# Patient Record
Sex: MALE | Race: WHITE | NOT HISPANIC OR LATINO | Employment: FULL TIME | URBAN - METROPOLITAN AREA
[De-identification: names, ages, dates, MRNs, and addresses within clinical notes are randomized per-mention and may not be internally consistent; named-entity substitution may affect disease eponyms.]

---

## 2017-03-13 ENCOUNTER — OFFICE VISIT (OUTPATIENT)
Dept: FAMILY MEDICINE | Facility: CLINIC | Age: 32
End: 2017-03-13
Payer: MEDICAID

## 2017-03-13 ENCOUNTER — TELEPHONE (OUTPATIENT)
Dept: FAMILY MEDICINE | Facility: CLINIC | Age: 32
End: 2017-03-13

## 2017-03-13 VITALS
HEART RATE: 88 BPM | OXYGEN SATURATION: 98 % | HEIGHT: 69 IN | TEMPERATURE: 98.3 F | SYSTOLIC BLOOD PRESSURE: 126 MMHG | WEIGHT: 315 LBS | DIASTOLIC BLOOD PRESSURE: 86 MMHG | BODY MASS INDEX: 46.65 KG/M2

## 2017-03-13 DIAGNOSIS — I80.01 SUPERFICIAL THROMBOPHLEBITIS OF RIGHT LEG: Primary | ICD-10-CM

## 2017-03-13 DIAGNOSIS — I83.891 VARICOSE VEINS OF RIGHT LEG WITH EDEMA: ICD-10-CM

## 2017-03-13 DIAGNOSIS — M79.661 RIGHT CALF PAIN: ICD-10-CM

## 2017-03-13 PROCEDURE — 99213 OFFICE O/P EST LOW 20 MIN: CPT | Performed by: FAMILY MEDICINE

## 2017-03-13 NOTE — MR AVS SNAPSHOT
After Visit Summary   3/13/2017    Se Robins    MRN: [id number]           Patient Information     Date Of Birth          1985        Visit Information        Provider Department      3/13/2017 10:20 AM Nathan Rhodes MD [facility]        Today's Diagnoses     Superficial thrombophlebitis of right leg    -  1    Right calf pain          Care Instructions    Please apply heat to affect area multiple times per day.  Ibuprofen 400-600mg 3 times per day with food.     If not improving or getting worse. Please call [phone] to schedule an appointment for ultrasound to rule out deep blood clot.           Follow-ups after your visit        Future tests that were ordered for you today     Open Future Orders        Priority Expected Expires Ordered    US Lower Extremity Venous Duplex Right Routine  3/13/2018 3/13/2017            Who to contact     If you have questions or need follow up information about today's clinic visit or your schedule please contact [facility] directly at [phone].  Normal or non-critical lab and imaging results will be communicated to you by [employer]Day[employer]over[employer], letter or phone within 4 business days after the clinic has received the results. If you do not hear from us within 7 days, please contact the clinic through [employer] or phone. If you have a critical or abnormal lab result, we will notify you by phone as soon as possible.  Submit refill requests through [employer] or call your pharmacy and they will forward the refill request to us. Please allow 3 business days for your refill to be completed.          Additional Information About Your Visit        1DayMakeoverhart Information     [employer] gives you secure access to your electronic health record. If you see a primary care provider, you can also send messages to your care team and make appointments. If you have questions, please call your primary care clinic.  If you do not have a primary care  "provider, please call [phone] and they will assist you.        Care EveryWhere ID     This is your Care EveryWhere ID. This could be used by other organizations to access your [facility] medical records  [id number]        Your Vitals Were     Pulse Temperature Height Pulse Oximetry BMI (Body Mass Index)       88 98.3  F (36.8  C) (Oral) 5' 9\" (1.753 m) 98% 49.32 kg/m2        Blood Pressure from Last 3 Encounters:   03/13/17 126/86   07/17/15 130/82   03/25/15 130/84    Weight from Last 3 Encounters:   03/13/17 (!) 334 lb (151.5 kg)   07/17/15 (!) 319 lb (144.7 kg)   03/25/15 (!) 315 lb 2 oz (142.9 kg)               Primary Care Provider Office Phone # Fax #    Denisse Alonso [phone]       [address] [address]        Thank you!     Thank you for choosing [facility]  for your care. Our goal is always to provide you with excellent care. Hearing back from our patients is one way we can continue to improve our services. Please take a few minutes to complete the written survey that you may receive in the mail after your visit with us. Thank you!             Your Updated Medication List - Protect others around you: Learn how to safely use, store and throw away your medicines at www.disposemymeds.org.      Notice  As of 3/13/2017 10:59 AM    You have not been prescribed any medications.      "

## 2017-03-13 NOTE — TELEPHONE ENCOUNTER
Pt called in c/o rt leg pain x 2 days. Not increasing.  Slightly swollen at inner thigh and at calf to ankle  Said there is a varicose vein that looks swollen in the rt inner thigh as well as another vein in the rt lower leg.  Appointment made for pt to be evaluated today at  with Dr Rhodes.  Pt in agreement with plan and verbalized understanding.  Thanks!  MARIO Guillen  Triage Nurse

## 2017-03-13 NOTE — NURSING NOTE
"Chief Complaint   Patient presents with     Musculoskeletal Problem       Initial /86 (Cuff Size: Adult Large)  Pulse 88  Temp 98.3  F (36.8  C) (Oral)  Ht 5' 9\" (1.753 m)  Wt (!) 334 lb (151.5 kg)  SpO2 98%  BMI 49.32 kg/m2 Estimated body mass index is 49.32 kg/(m^2) as calculated from the following:    Height as of this encounter: 5' 9\" (1.753 m).    Weight as of this encounter: 334 lb (151.5 kg).  Medication Reconciliation: complete     Jennifer Degroot, DRU      "

## 2017-03-13 NOTE — PROGRESS NOTES
SUBJECTIVE:                                                    Se Robins is a 32 year old male who presents to clinic today for the following health issues:      Musculoskeletal problem/pain      Duration: few days    Description  Location: right leg above knee and inside of calf    Intensity:  mild    Accompanying signs and symptoms: swelling and tightness around calf and nervous because vein is big     History  Previous similar problem: no   Previous evaluation:  none    Precipitating or alleviating factors:  Trauma or overuse: no   Aggravating factors include: none    Therapies tried and outcome: nothing       Medial right knee, right medial calf area - bruise and some pain. Minimal swelling.     States he is smoking and he is overweight.     Few weeks ago - 4 hrs of flight.     No FH of blood clot.     Problem list and histories reviewed & adjusted, as indicated.  Additional history: as documented    Labs reviewed in EPIC    Reviewed and updated as needed this visit by clinical staff  Tobacco  Allergies  Meds  Med Hx  Surg Hx  Fam Hx  Soc Hx      Reviewed and updated as needed this visit by Provider           Social History     Social History     Marital status:      Spouse name: N/A     Number of children: N/A     Years of education: N/A     Social History Main Topics     Smoking status: Current Every Day Smoker     Packs/day: 1.00     Types: Cigarettes     Smokeless tobacco: Never Used     Alcohol use Yes      Comment: occasionally     Drug use: Yes     Special: Marijuana      Comment: cannibis     Sexual activity: Yes     Partners: Female     Other Topics Concern     Parent/Sibling W/ Cabg, Mi Or Angioplasty Before 65f 55m? No     Social History Narrative     No Known Allergies  Patient Active Problem List   Diagnosis     Major depression     Anxiety     CARDIOVASCULAR SCREENING; LDL GOAL LESS THAN 160     Varicose veins of right leg with edema     Reviewed medications, social history and   "past medical and surgical history.    Review of system: for general, respiratory, CVS, GI and psychiatry negative except for noted above.     EXAM:  /86 (Cuff Size: Adult Large)  Pulse 88  Temp 98.3  F (36.8  C) (Oral)  Ht 5' 9\" (1.753 m)  Wt (!) 334 lb (151.5 kg)  SpO2 98%  BMI 49.32 kg/m2  Constitutional: healthy, alert and no distress   Psychiatric: mentation appears normal and affect normal/bright  Abdomen  Morbidly obese.  Right leg - on medial side varicose vein present. On medial knee and just above ankle small rubbery nodular lesion with erythema and mild tendenress    ASSESSMENT / PLAN:  (I80.01) Superficial thrombophlebitis of right leg  (primary encounter diagnosis)  Comment: most likely from presentation.  Plan: dvt very less likely - see below for discussion.     (M79.661) Right calf pain  Comment:  Due to his travel, weight and smoking reasonable to rule out DVT if superficial thrombophlebitis symptoms are not improving with home care but my suspicion is low at this point.   Plan: US Lower Extremity Venous Duplex Right              (I83.891) Varicose veins of right leg with edema  Comment:    Plan: see above.       Patient Instructions   Please apply heat to affect area multiple times per day.  Ibuprofen 400-600mg 3 times per day with food.     If not improving or getting worse. Please call [phone] to schedule an appointment for ultrasound to rule out deep blood clot.             "

## 2017-03-13 NOTE — PATIENT INSTRUCTIONS
Please apply heat to affect area multiple times per day.  Ibuprofen 400-600mg 3 times per day with food.     If not improving or getting worse. Please call [phone] to schedule an appointment for ultrasound to rule out deep blood clot.

## 2017-08-14 ENCOUNTER — APPOINTMENT (OUTPATIENT)
Dept: ULTRASOUND IMAGING | Facility: CLINIC | Age: 32
End: 2017-08-14
Attending: EMERGENCY MEDICINE
Payer: COMMERCIAL

## 2017-08-14 ENCOUNTER — HOSPITAL ENCOUNTER (EMERGENCY)
Facility: CLINIC | Age: 32
Discharge: HOME | End: 2017-08-14
Attending: EMERGENCY MEDICINE | Admitting: EMERGENCY MEDICINE
Payer: COMMERCIAL

## 2017-08-14 ENCOUNTER — TELEPHONE (OUTPATIENT)
Dept: FAMILY MEDICINE | Facility: CLINIC | Age: 32
End: 2017-08-14

## 2017-08-14 VITALS
WEIGHT: 315 LBS | DIASTOLIC BLOOD PRESSURE: 88 MMHG | TEMPERATURE: 98.5 F | SYSTOLIC BLOOD PRESSURE: 120 MMHG | BODY MASS INDEX: 46.96 KG/M2 | HEART RATE: 94 BPM | OXYGEN SATURATION: 95 % | RESPIRATION RATE: 18 BRPM

## 2017-08-14 DIAGNOSIS — I80.01 THROMBOPHLEBITIS OF SUPERFICIAL VEINS OF RIGHT LOWER EXTREMITY: ICD-10-CM

## 2017-08-14 PROCEDURE — 25000128 H RX IP 250 OP 636: Performed by: EMERGENCY MEDICINE

## 2017-08-14 PROCEDURE — 99284 EMERGENCY DEPT VISIT MOD MDM: CPT | Mod: 25 | Performed by: EMERGENCY MEDICINE

## 2017-08-14 PROCEDURE — 99284 EMERGENCY DEPT VISIT MOD MDM: CPT | Mod: Z6 | Performed by: EMERGENCY MEDICINE

## 2017-08-14 PROCEDURE — 96372 THER/PROPH/DIAG INJ SC/IM: CPT | Performed by: EMERGENCY MEDICINE

## 2017-08-14 PROCEDURE — 93971 EXTREMITY STUDY: CPT | Mod: RT

## 2017-08-14 RX ADMIN — ENOXAPARIN SODIUM 150 MG: 150 INJECTION SUBCUTANEOUS at 14:14

## 2017-08-14 ASSESSMENT — ENCOUNTER SYMPTOMS
DIFFICULTY URINATING: 0
FEVER: 0
AGITATION: 0
ABDOMINAL PAIN: 0
NUMBNESS: 0
SHORTNESS OF BREATH: 0
WEAKNESS: 0
COLOR CHANGE: 1
ARTHRALGIAS: 0

## 2017-08-14 NOTE — ED PROVIDER NOTES
[facility] EMERGENCY DEPARTMENT ([facility])    8/14/17       History     Chief Complaint   Patient presents with     Leg Pain     Pt c/o R inner thigh pain and swelling. Pt notes prev. blood clot in March.     HPI  Se Robins is a 32 year old male who was recently treated for superficial thrombophlebitis in March of this year with heat packs and ibuprofen. Patient presents to the Emergency Department today for evaluation of pain, swelling and redness of his medial right thigh. Patient reports the symptoms began 4-5 days ago and he notes difficulty walking and difficulty getting in and out of his truck. Pain is constant, throbbing, non-radiating, mild to moderate, relieved with laying down on his right side and uses a heat pad on his leg. Movement makes the pain worse. Patient notes travelling in his truck weekly to his cabin which is 3 hours each way. He also reports being on a plane a few weeks ago. He denies any groin pain, increased shortness of breath, chest pain or abdominal pain.    I have reviewed the Medications, Allergies, Past Medical and Surgical History, and Social History in the [employer] system.    Past Medical History:   Diagnosis Date     Anxiety      Lymphangioma     per pt this was the diagnosis of his skin below the axilla on the right       History reviewed. No pertinent surgical history.    Family History   Problem Relation Age of Onset     Arthritis Mother      Depression Mother      CEREBROVASCULAR DISEASE Mother      Alcohol/Drug Father      Depression Father      Substance Abuse Father      Alcohol/Drug Brother      Depression Brother      Schizophrenia Brother      Bipolar Disorder Brother      Substance Abuse Brother      Schizophrenia Brother      Bipolar Disorder Brother        Social History   Substance Use Topics     Smoking status: Current Every Day Smoker     Packs/day: 1.00     Types: Cigarettes     Smokeless tobacco: Never Used     Alcohol use Yes      Comment:  occasionally     No current facility-administered medications for this encounter.      Current Outpatient Prescriptions   Medication     [START ON 8/15/2017] enoxaparin (LOVENOX) 150 MG/ML injection         Review of Systems   Constitutional: Negative for fever.   Respiratory: Negative for shortness of breath.    Cardiovascular: Negative for chest pain.   Gastrointestinal: Negative for abdominal pain.   Genitourinary: Negative for difficulty urinating.   Musculoskeletal: Negative for arthralgias.   Skin: Positive for color change.   Allergic/Immunologic: Negative for immunocompromised state.   Neurological: Negative for weakness and numbness.   Psychiatric/Behavioral: Negative for agitation and behavioral problems.   All other systems reviewed and are negative.      Physical Exam   BP: (!) 128/92  Pulse: 94  Temp: 98.5  F (36.9  C)  Resp: 16  Weight: (!) 144.2 kg (318 lb)  SpO2: 94 %  Physical Exam   Constitutional: He is oriented to person, place, and time. He appears well-developed and well-nourished. No distress.   HENT:   Head: Normocephalic and atraumatic.   Eyes: Conjunctivae and EOM are normal. No scleral icterus.   Neck: Normal range of motion.   Cardiovascular: Normal rate and intact distal pulses.    Pulmonary/Chest: Effort normal. No respiratory distress.   Musculoskeletal: Normal range of motion. He exhibits tenderness. He exhibits no edema.   20 cm x 10 cm erythema without induration to right medial thigh. Significant amount of varicose veins in right lower extremity with several areas of hardening over the venous pathway.   Neurological: He is alert and oriented to person, place, and time. No cranial nerve deficit. He exhibits normal muscle tone. Coordination normal.   Skin: Skin is warm. No rash noted. He is not diaphoretic. There is erythema.   20 cm x 10 cm erythema without induration or fluctuance to right medial thigh. Significant amount of varicose veins in right lower extremity with several  areas of hardening over the venous pathway.   Psychiatric: He has a normal mood and affect. His behavior is normal. Judgment and thought content normal.   Nursing note and vitals reviewed.      ED Course     ED Course     Procedures             Critical Care time:  none               Labs Ordered and Resulted from Time of ED Arrival Up to the Time of Departure from the ED - No data to display         Assessments & Plan (with Medical Decision Making)   32-year-old man presenting with right leg swelling and redness for 3-4 days after a prolonged drive and a recent flight to [place], [place]. Differential diagnosis: DVT, superficial thrombophlebitis, unlikely cellulitis.    After thorough history and physical exam, patient appears to be in no acute distress. I obtained right lower extremity ultrasound and I reviewed the study and read the radiology report; there is significant clot present in greater saphenous vein involving almost entire right thigh.    Given patient s symptoms, the extent of the clot burden, and recurrence of his symptoms I believe that at this point it is prudent to initiate anticoagulation since his clot is very close to the deep venous system. I discussed this case with the hematologist on-call, , who agreed and recommended to place him on subcutaneous Lovenox injections with outpatient follow-up. I will treat the patient with subcutaneous Lovenox in the Emergency Department and refer him to hematology and his primary care clinics. He agrees with the plan. He was taught and instructed how to use Lovenox injections at home. He is comfortable with this plan. At this point he will be discharged.     This part of the medical record was transcribed by Alexy Posadas Scribe, from a dictation done by Thomas Cleaning MD.       I have reviewed the nursing notes.    I have reviewed the findings, diagnosis, plan and need for follow up with the patient.    Discharge Medication List as of  8/14/2017  2:24 PM      START taking these medications    Details   enoxaparin (LOVENOX) 150 MG/ML injection Inject 1 mL (150 mg) Subcutaneous every 12 hours for 14 days, Disp-28 mL, R-0, Local PrintFirst dose given in the emergency department at 2:30 PM on August 14, 2017.             Final diagnoses:   Thrombophlebitis of superficial veins of right lower extremity - greater saphenous vein     IVenkat, am serving as a trained medical scribe to document services personally performed by Thomas Cleaning MD, based on the provider's statements to me.   Thomas RIVERA MD, was physically present and have reviewed and verified the accuracy of this note documented by Venkat Ibarra.    8/14/2017   [facility], [place], EMERGENCY DEPARTMENT     Cindy Cleaning MD  08/14/17 [id number]

## 2017-08-14 NOTE — ED AVS SNAPSHOT
[facility], [place], Emergency Department    [address]    [place] [address]    Phone:  [phone]    Fax:  [phone]                                       Se Robins   MRN: [id number]    Department:  [facility], Emergency Department   Date of Visit:  8/14/2017           After Visit Summary Signature Page     I have received my discharge instructions, and my questions have been answered. I have discussed any challenges I see with this plan with the nurse or doctor.    ..........................................................................................................................................  Patient/Patient Representative Signature      ..........................................................................................................................................  Patient Representative Print Name and Relationship to Patient    ..................................................               ................................................  Date                                            Time    ..........................................................................................................................................  Reviewed by Signature/Title    ...................................................              ..............................................  Date                                                            Time

## 2017-08-14 NOTE — DISCHARGE INSTRUCTIONS
Please make an appointment to follow up with Your Primary Care Provider and [facility] Clinic (phone: [phone]) in 2-3 days for further evaluation, recommendations, and transition to oral anticoagulant medications.

## 2017-08-14 NOTE — ED AVS SNAPSHOT
[facility], Emergency Department    [address]    [place] [address]    Phone:  [phone]    Fax:  [phone]                                       Se Robins   MRN: [id number]    Department:  [facility], Emergency Department   Date of Visit:  8/14/2017           Patient Information     Date Of Birth          1985        Your diagnoses for this visit were:     Thrombophlebitis of superficial veins of right lower extremity greater saphenous vein       You were seen by Cindy Cleaning MD.        Discharge Instructions       Please make an appointment to follow up with Your Primary Care Provider and [facility] Clinic (phone: [phone]) in 2-3 days for further evaluation, recommendations, and transition to oral anticoagulant medications.      Discharge References/Attachments     THROMBOPHLEBITIS, SUPERFICIAL (ENGLISH)    ENOXAPARIN INJECTION (ENGLISH)      24 Hour Appointment Hotline       To make an appointment at any [facility], call [phone] ([phone]). If you don't have a family doctor or clinic, we will help you find one. [facility] clinics are conveniently located to serve the needs of you and your family.             Review of your medicines      START taking        Dose / Directions Last dose taken    enoxaparin 150 MG/ML injection   Commonly known as:  LOVENOX   Dose:  1 mg/kg   Quantity:  28 mL   Start taking on:  8/15/2017        Inject 1 mL (150 mg) Subcutaneous every 12 hours for 14 days   Refills:  0                Prescriptions were sent or printed at these locations (1 Prescription)                   Other Prescriptions                Printed at Department/Unit printer (1 of 1)         enoxaparin (LOVENOX) 150 MG/ML injection                Procedures and tests performed during your visit     US Lower Extremity Venous Duplex Right      Orders Needing Specimen Collection     None      Pending Results     No orders found from 8/12/2017 to  8/15/2017.            Pending Culture Results     No orders found from 8/12/2017 to 8/15/2017.            Pending Results Instructions     If you had any lab results that were not finalized at the time of your Discharge, you can call the ED Lab Result RN at [phone]. You will be contacted by this team for any positive Lab results or changes in treatment. The nurses are available 7 days a week from 10A to 6:30P.  You can leave a message 24 hours per day and they will return your call.        Thank you for choosing [facility]       Thank you for choosing [facility] for your care. Our goal is always to provide you with excellent care. Hearing back from our patients is one way we can continue to improve our services. Please take a few minutes to complete the written survey that you may receive in the mail after you visit with us. Thank you!        Mind[employer] Information     [employer] gives you secure access to your electronic health record. If you see a primary care provider, you can also send messages to your care team and make appointments. If you have questions, please call your primary care clinic.  If you do not have a primary care provider, please call [phone] and they will assist you.        Care EveryWhere ID     This is your Care EveryWhere ID. This could be used by other organizations to access your [facility] medical records  [id number]        Equal Access to Services     WARD WALDRON : Blu alonsoo Francois, waaxda luqadaha, qaybta kaalmada adeegyada, larisa garcia. So [facility] [phone].    ATENCIÓN: Si habla español, tiene a bynum disposición servicios gratuitos de asistencia lingüística. Llame al [phone].    We comply with applicable federal civil rights laws and [place] laws. We do not discriminate on the basis of race, color, national origin, age, disability sex, sexual orientation or gender identity.            After Visit Summary       This is your record. Keep  this with you and show to your community pharmacist(s) and doctor(s) at your next visit.

## 2017-08-14 NOTE — TELEPHONE ENCOUNTER
PT's wife called for appt.  Pt has blood clot in thigh.  Hx of varicose veins.  Increased pain, redness, and warm.    No cp or shortness of breath. No trouble breathing .  Pt was seen 3/13/17 for similar issue.    Triage was going to make appt for tomorrow in clinic. When I reviewed sx to go right to ER- increased pain, redness, warmth, swelling. All those sx are current.  Rec pt go to ER today.  Wife in agreement with plan.  MARIO Cunningham

## 2019-10-04 ENCOUNTER — HEALTH MAINTENANCE LETTER (OUTPATIENT)
Age: 34
End: 2019-10-04

## 2020-11-14 ENCOUNTER — HEALTH MAINTENANCE LETTER (OUTPATIENT)
Age: 35
End: 2020-11-14

## 2021-05-24 ENCOUNTER — RECORDS - HEALTHEAST (OUTPATIENT)
Dept: ADMINISTRATIVE | Facility: CLINIC | Age: 36
End: 2021-05-24

## 2021-09-12 ENCOUNTER — HEALTH MAINTENANCE LETTER (OUTPATIENT)
Age: 36
End: 2021-09-12

## 2021-10-22 ENCOUNTER — ANCILLARY PROCEDURE (OUTPATIENT)
Dept: GENERAL RADIOLOGY | Facility: CLINIC | Age: 36
End: 2021-10-22
Attending: NURSE PRACTITIONER
Payer: COMMERCIAL

## 2021-10-22 ENCOUNTER — OFFICE VISIT (OUTPATIENT)
Dept: URGENT CARE | Facility: URGENT CARE | Age: 36
End: 2021-10-22
Payer: COMMERCIAL

## 2021-10-22 VITALS
DIASTOLIC BLOOD PRESSURE: 82 MMHG | SYSTOLIC BLOOD PRESSURE: 128 MMHG | HEART RATE: 90 BPM | RESPIRATION RATE: 16 BRPM | OXYGEN SATURATION: 100 % | TEMPERATURE: 100.4 F

## 2021-10-22 DIAGNOSIS — M79.642 PAIN OF LEFT HAND: ICD-10-CM

## 2021-10-22 DIAGNOSIS — S63.602A SPRAIN OF LEFT THUMB, INITIAL ENCOUNTER: ICD-10-CM

## 2021-10-22 DIAGNOSIS — M79.645 PAIN OF LEFT THUMB: ICD-10-CM

## 2021-10-22 DIAGNOSIS — L03.311 ABDOMINAL WALL CELLULITIS: Primary | ICD-10-CM

## 2021-10-22 PROCEDURE — 73130 X-RAY EXAM OF HAND: CPT | Mod: LT | Performed by: RADIOLOGY

## 2021-10-22 PROCEDURE — 99204 OFFICE O/P NEW MOD 45 MIN: CPT | Mod: 25 | Performed by: NURSE PRACTITIONER

## 2021-10-22 PROCEDURE — 96372 THER/PROPH/DIAG INJ SC/IM: CPT | Performed by: NURSE PRACTITIONER

## 2021-10-22 RX ORDER — CEFTRIAXONE SODIUM 1 G
1 VIAL (EA) INJECTION ONCE
Status: COMPLETED | OUTPATIENT
Start: 2021-10-22 | End: 2021-10-22

## 2021-10-22 RX ORDER — DOXYCYCLINE 100 MG/1
100 TABLET ORAL 2 TIMES DAILY
Qty: 20 TABLET | Refills: 0 | Status: SHIPPED | OUTPATIENT
Start: 2021-10-22 | End: 2021-11-01

## 2021-10-22 RX ADMIN — Medication 1 G: at 16:52

## 2021-10-22 NOTE — PROGRESS NOTES
Chief Complaint   Patient presents with     Urgent Care     Musculoskeletal Problem     possible broken or thumb sprain on Monday night playing softball     Derm Problem     painful lump on stomach since Monday         ICD-10-CM    1. Abdominal wall cellulitis  L03.311 cefTRIAXone (ROCEPHIN) in lidocaine 1% (PF) injection 1 g     doxycycline monohydrate (ADOXA) 100 MG tablet   2. Pain of left hand  M79.642 XR Hand Left G/E 3 Views     THUMB SPICA SPLINT   3. Pain of left thumb  M79.645 XR Hand Left G/E 3 Views     THUMB SPICA SPLINT   4. Sprain of left thumb, initial encounter  S63.602A THUMB SPICA SPLINT     Patient with large abdominal wall cellulitis will be treated with injectable antibiotic and sent home with oral to follow-up.  If the area does not decrease in size over the next 24 hours patient is instructed to go to the emergency room for further treatment.  This is a very large area and there could be an internal abscess with sepsis being a risk.    Wear thumb spica splint until thumb is feeling better.  May use ibuprofen or Tylenol as needed for pain continue icing for 15 to 20 minutes a couple times a day until swelling has resolved.  If he is still having pain in 3 weeks he is instructed to have a repeat x-ray.      Xray - Reviewed and interpreted by me.  Right hand x-ray is negative for fractures or dislocations.    Subjective     Se Robins is an 36 year old male who presents to clinic today for pain in the right thumb and near the first metacarpal since playing softball 6 days ago and catching a ball in the glove where his thumb was bent backwards.    Secondly he has a large area of erythema and pain under his abdominal fold on the right side.  He has been running low-grade fevers and the pain has been worsening.    ROS: 10 point ROS neg other than the symptoms noted above in the HPI.       Objective    /82   Pulse 90   Temp 100.4  F (38  C)   Resp 16   SpO2 100%     Physical Exam        GENERAL APPEARANCE: healthy appearing, alert     RESP: lungs clear to auscultation - no rales, rhonchi or wheezes     CV: regular rates and rhythm, no murmurs, rubs, or gallop     ABDOMEN:  soft, nontender, no HSM or masses and bowel sounds normal     MS: tender to palpation over the base of the thumb and the first metacarpal, swelling and ecchymosis is present, all other extremities appear normal.     SKIN: Large area of erythema measuring 9 inches x 7 inches under the right abdominal fold with central area of induration and pain to palpation     NEURO: Normal strength and tone, mentation intact and speech normal     PSYCH: normal thought process; no significant mood disturbance    Patient Instructions   If the area of redness does not diminish in size in 24 to 36 hours please go to the emergency room for further treatment.    Wear the thumb spica splint until the thumb is feeling better.  Ice for 20 minutes several times a day until the swelling has resolved.    Acetaminophen (Tylenol) may be taken up to 4000mg daily (3000mg if over 65) which would be 2 regular strength tablets (325mg) or two extra strength tablets(500mg) up to 4 times a day (3 times a day if over 65).   Check for acetaminophen in other OTC or prescription medications and be sure you add this in the maximum amount you take every day.    Too much acetaminophen can lead to serious liver damage. DO NOT TAKE if you have a history of liver disease.    Ibuprofen 200mg tablets may be taken up to 4 pills 4 times a day(three times a day if over 65)  to the maximum of 3200mg,  (2400mg if >65)  daily as needed for pain.   Take with food.  Don't take with aspirin, Aleve or other antiinflammatory medication or with warfarin. DO NOT TAKE if you have a history of kidney disease.    Patient Education     Discharge Instructions for Cellulitis   You have been diagnosed with cellulitis. This is an infection in the deepest layer of the skin and tissue beneath the  skin. In some cases, the infection also affects the muscle. Cellulitis is caused by bacteria. The bacteria can enter the body through broken skin. This can happen with a cut, scratch, animal bite, or an insect bite that has been scratched. You may have been treated in the hospital with antibiotics and fluids. You will likely be given a prescription for antibiotics to take at home. This sheet will help you take care of yourself at home.  Home care  When you are home:    Take the prescribed antibiotic medicine you are given as directed until it is gone. Take it even if you feel better. It treats the infection and stops it from returning. Not taking all the medicine can make future infections hard to treat.    Keep the infected area clean.    When possible, raise the infected area above the level of your heart. This helps keep swelling down.    Talk with your healthcare provider if you are in pain. Ask what kind of over-the-counter medicine you can take for pain.    Apply clean bandages as advised.    Take your temperature once a day for a week.    Wash your hands often to prevent spreading the infection.  In the future, wash your hands before and after you touch cuts, scratches, or bandages. This will help prevent infection.   When to call your healthcare provider  Call your healthcare provider right away if you have any of the following:    Trouble or pain when moving the joints above or below the infected area    Discharge or pus draining from the area    Fever of 100.4 F (38 C) or higher, or as directed by your healthcare provider    Pain that gets worse in or around the infected     Redness that gets worse in or around the infected area, particularly if the area of redness expands to a wider area    Shaking chills    Swelling of the infected area    Vomiting  Lizette last reviewed this educational content on 11/1/2019 2000-2021 [employer]. All rights reserved. This information is not intended as  a substitute for professional medical care. Always follow your healthcare professional's instructions.               MUKUND Singer, CNP  [facility] Provider

## 2021-10-22 NOTE — PATIENT INSTRUCTIONS
If the area of redness does not diminish in size in 24 to 36 hours please go to the emergency room for further treatment.    Wear the thumb spica splint until the thumb is feeling better.  Ice for 20 minutes several times a day until the swelling has resolved.    Acetaminophen (Tylenol) may be taken up to 4000mg daily (3000mg if over 65) which would be 2 regular strength tablets (325mg) or two extra strength tablets(500mg) up to 4 times a day (3 times a day if over 65).   Check for acetaminophen in other OTC or prescription medications and be sure you add this in the maximum amount you take every day.    Too much acetaminophen can lead to serious liver damage. DO NOT TAKE if you have a history of liver disease.    Ibuprofen 200mg tablets may be taken up to 4 pills 4 times a day(three times a day if over 65)  to the maximum of 3200mg,  (2400mg if >65)  daily as needed for pain.   Take with food.  Don't take with aspirin, Aleve or other antiinflammatory medication or with warfarin. DO NOT TAKE if you have a history of kidney disease.    Patient Education     Discharge Instructions for Cellulitis   You have been diagnosed with cellulitis. This is an infection in the deepest layer of the skin and tissue beneath the skin. In some cases, the infection also affects the muscle. Cellulitis is caused by bacteria. The bacteria can enter the body through broken skin. This can happen with a cut, scratch, animal bite, or an insect bite that has been scratched. You may have been treated in the hospital with antibiotics and fluids. You will likely be given a prescription for antibiotics to take at home. This sheet will help you take care of yourself at home.  Home care  When you are home:    Take the prescribed antibiotic medicine you are given as directed until it is gone. Take it even if you feel better. It treats the infection and stops it from returning. Not taking all the medicine can make future infections hard to  treat.    Keep the infected area clean.    When possible, raise the infected area above the level of your heart. This helps keep swelling down.    Talk with your healthcare provider if you are in pain. Ask what kind of over-the-counter medicine you can take for pain.    Apply clean bandages as advised.    Take your temperature once a day for a week.    Wash your hands often to prevent spreading the infection.  In the future, wash your hands before and after you touch cuts, scratches, or bandages. This will help prevent infection.   When to call your healthcare provider  Call your healthcare provider right away if you have any of the following:    Trouble or pain when moving the joints above or below the infected area    Discharge or pus draining from the area    Fever of 100.4 F (38 C) or higher, or as directed by your healthcare provider    Pain that gets worse in or around the infected     Redness that gets worse in or around the infected area, particularly if the area of redness expands to a wider area    Shaking chills    Swelling of the infected area    Vomiting  [employer] last reviewed this educational content on 11/1/2019 2000-2021 [employer]. All rights reserved. This information is not intended as a substitute for professional medical care. Always follow your healthcare professional's instructions.

## 2021-10-25 ENCOUNTER — NURSE TRIAGE (OUTPATIENT)
Dept: NURSING | Facility: CLINIC | Age: 36
End: 2021-10-25

## 2021-10-25 NOTE — TELEPHONE ENCOUNTER
Caller states he has been on antibiotic for an abdominal cellulitis for  3 days; Area broke open today and has a steady stream   Of purulent discharge for several hours; no new fever or spreading redness but  abscess remains painful; unsure how big opening is that pus is coming through.   Triage protocol reviewed   Advised that  On call PCP will be consulted for disposition   6:11 PM  Call back from Dr. Saxena; advised patient to be seen  In clinic or UC within 24 hrs  Sooner if new or worsening symptoms   Discussed with caller and he will go to UC tomorrow  Advised in careful discarding of purulent  bandages and  good handwashing and skin care   Advised to call back or proceed to ED for any new or worseninsg symptoms   Caller understands and will comply   Jany Holloway RN  FNA     Jany Holloway RN  FNA        Reason for Disposition    [1] Caller has URGENT question AND [2] triager unable to answer question    Additional Information    Negative:  surgical wound infection suspected (post-op)    Negative: Surgical wound infection suspected (post-op)    Negative: [1] Widespread rash AND [2] drug rash suspected (i.e., allergic reaction to antibiotic)    Negative: Animal bite wound infection suspected    Negative: Shock suspected (e.g., cold/pale/clammy skin, too weak to stand, low BP, rapid pulse)    Negative: Sounds like a life-threatening emergency to the triager    Negative: SEVERE pain    Negative: [1] SEVERE pain with bending of finger (or toe) AND [2] cellulitis on hand (or foot)    Negative: Fever > 104 F (40 C)    Negative: [1] Widespread rash AND [2] bright red, sunburn-like    Negative: Black (necrotic), dark purple, or blisters develop in area of cellulitis    Negative: Patient sounds very sick or weak to the triager    Negative: [1] Taking antibiotic > 24 hours AND [2] fever > 100.4 F (38.0 C)    Negative: [1] Taking antibiotic > 24 hours AND [2] red streak (or line) runs from area of  infection    Protocols used: CELLULITIS ON ANTIBIOTIC FOLLOW-UP CALL-A-AH

## 2021-10-26 ENCOUNTER — OFFICE VISIT (OUTPATIENT)
Dept: URGENT CARE | Facility: URGENT CARE | Age: 36
End: 2021-10-26
Payer: COMMERCIAL

## 2021-10-26 VITALS
HEART RATE: 84 BPM | SYSTOLIC BLOOD PRESSURE: 118 MMHG | OXYGEN SATURATION: 97 % | TEMPERATURE: 98.1 F | DIASTOLIC BLOOD PRESSURE: 68 MMHG | RESPIRATION RATE: 16 BRPM

## 2021-10-26 DIAGNOSIS — L02.219 CELLULITIS AND ABSCESS OF TRUNK: ICD-10-CM

## 2021-10-26 DIAGNOSIS — L03.319 CELLULITIS AND ABSCESS OF TRUNK: ICD-10-CM

## 2021-10-26 DIAGNOSIS — L02.211 ABDOMINAL WALL ABSCESS: ICD-10-CM

## 2021-10-26 DIAGNOSIS — R68.83 CHILLS: Primary | ICD-10-CM

## 2021-10-26 LAB
BASOPHILS # BLD AUTO: 0.1 10E3/UL (ref 0–0.2)
BASOPHILS NFR BLD AUTO: 1 %
EOSINOPHIL # BLD AUTO: 0.3 10E3/UL (ref 0–0.7)
EOSINOPHIL NFR BLD AUTO: 3 %
ERYTHROCYTE [DISTWIDTH] IN BLOOD BY AUTOMATED COUNT: 13.3 % (ref 10–15)
HCT VFR BLD AUTO: 42.1 % (ref 40–53)
HGB BLD-MCNC: 14.3 G/DL (ref 13.3–17.7)
IMM GRANULOCYTES # BLD: 0.1 10E3/UL
IMM GRANULOCYTES NFR BLD: 1 %
LYMPHOCYTES # BLD AUTO: 3 10E3/UL (ref 0.8–5.3)
LYMPHOCYTES NFR BLD AUTO: 33 %
MCH RBC QN AUTO: 29.4 PG (ref 26.5–33)
MCHC RBC AUTO-ENTMCNC: 34 G/DL (ref 31.5–36.5)
MCV RBC AUTO: 87 FL (ref 78–100)
MONOCYTES # BLD AUTO: 1 10E3/UL (ref 0–1.3)
MONOCYTES NFR BLD AUTO: 11 %
NEUTROPHILS # BLD AUTO: 4.6 10E3/UL (ref 1.6–8.3)
NEUTROPHILS NFR BLD AUTO: 51 %
PLATELET # BLD AUTO: 312 10E3/UL (ref 150–450)
RBC # BLD AUTO: 4.86 10E6/UL (ref 4.4–5.9)
WBC # BLD AUTO: 9.1 10E3/UL (ref 4–11)

## 2021-10-26 PROCEDURE — 36415 COLL VENOUS BLD VENIPUNCTURE: CPT | Performed by: FAMILY MEDICINE

## 2021-10-26 PROCEDURE — 85025 COMPLETE CBC W/AUTO DIFF WBC: CPT | Performed by: FAMILY MEDICINE

## 2021-10-26 PROCEDURE — 99214 OFFICE O/P EST MOD 30 MIN: CPT | Performed by: FAMILY MEDICINE

## 2021-10-26 PROCEDURE — 87077 CULTURE AEROBIC IDENTIFY: CPT | Performed by: FAMILY MEDICINE

## 2021-10-26 PROCEDURE — 87070 CULTURE OTHR SPECIMN AEROBIC: CPT | Performed by: FAMILY MEDICINE

## 2021-10-26 RX ORDER — SULFAMETHOXAZOLE/TRIMETHOPRIM 800-160 MG
1 TABLET ORAL 2 TIMES DAILY
Qty: 20 TABLET | Refills: 0 | Status: SHIPPED | OUTPATIENT
Start: 2021-10-26 | End: 2022-02-03

## 2021-10-26 NOTE — PROGRESS NOTES
Chief Complaint   Patient presents with     Urgent Care     Derm Problem     rash rlq on abdomen, was seen 4 days ago.        Differential Diagnosis:  Skin cellulitis, skin abscess, carbuncle   Medical Decision Making:    ASSESMENT AND PLAN   Se was seen today for urgent care and derm problem.    Diagnoses and all orders for this visit:    Chills    Cellulitis and abscess of trunk  -     Abscess Aerobic Bacterial Culture Routine  -     sulfamethoxazole-trimethoprim (BACTRIM DS) 800-160 MG tablet; Take 1 tablet by mouth 2 times daily  -     CBC with platelets and differential; Future  -     CBC with platelets and differential    Abdominal wall abscess    as  worsening chills and worsening redness would consider getting a CBC  Wound culture obtained area was cleaned bacitracin applied and gauze applied and secured with paper tape    Tylenol and Rest  Advised to  Do warm compress  Wound culture obtained   Routine discharge counseling was given to the patient and the patient understands that worsening, changing or persistent symptoms should prompt an immediate call or follow up with their primary physician or the emergency department. The importance of appropriate follow up was also discussed with the patient.     I have reviewed the nursing notes.  Time  spent on the date of the encounter doing chart review, patient visit, documentation and discussion with family     see orders in Epic  Pt verbalized and agreed with the plan and is aware of the worsening symptoms for which would need to follow up .  Pt was stable during time of discharge from the clinic     SUBJECTIVE     Se Robins is a 36 year old male presenting with a chief complaint of    Chief Complaint   Patient presents with     Urgent Care     Derm Problem     rash rlq on abdomen, was seen 4 days ago.            Se Robins is a 36 year old male presenting with a chief complaint of follow-up of right lower abdominal wall skin abscess.  He was started  on doxycycline 4 days back and day back the abscess opened up and pus started draining.  Has been also noticing worsening redness in the area with worsening tenderness.  Denies any acute fever but has been noticing some chills.  Overall he has been feeling fine has some nausea for 5 days back has none now.   He is an established patient of [facility].  Onset of symptoms was 11 day(s) ago.  Course of illness is worsening.    Severity moderate  Current and Associated symptoms: pain , redness , drainage   Treatment measures tried include see above .  Predisposing factors include akin abscess.    Past Medical History:   Diagnosis Date     Anxiety      Lymphangioma     per pt this was the diagnosis of his skin below the axilla on the right     Current Outpatient Medications   Medication Sig Dispense Refill     doxycycline monohydrate (ADOXA) 100 MG tablet Take 1 tablet (100 mg) by mouth 2 times daily for 10 days 20 tablet 0     sulfamethoxazole-trimethoprim (BACTRIM DS) 800-160 MG tablet Take 1 tablet by mouth 2 times daily 20 tablet 0     Social History     Tobacco Use     Smoking status: Former Smoker     Packs/day: 1.00     Types: Cigarettes     Smokeless tobacco: Never Used   Substance Use Topics     Alcohol use: Yes     Comment: occasionally     Family History   Problem Relation Age of Onset     Arthritis Mother      Depression Mother      Cerebrovascular Disease Mother      Alcohol/Drug Father      Depression Father      Substance Abuse Father      Alcohol/Drug Brother      Depression Brother      Schizophrenia Brother      Bipolar Disorder Brother      Substance Abuse Brother      Schizophrenia Brother      Bipolar Disorder Brother          ROS:    10 point ROS of systems including Constitutional, Eyes, Respiratory, Cardiovascular, Gastroenterology, Genitourinary Muscularskeletal, Psychiatric ,neurological were all negative except for pertinent positives noted in my HPI         OBJECTIVE:    /68   Pulse 84    Temp 98.1  F (36.7  C) (Oral)   Resp 16   SpO2 97%   GENERAL APPEARANCE: healthy, alert and no distress  EYES: EOMI,  PERRL, conjunctiva clear  HENT: ear canals and TM's normal.  Nose and mouth without ulcers, erythema or lesions  CV: regular rates and rhythm, normal S1 S2, no murmur noted  ABDOMEN:  soft, tender area around the skin abscess, no HSM or masses and bowel sounds normal  SKIN:  area of induration noted measuring about 38 cm by 10 cm tiny opening noted with no drainage noted currently  PSYCH: mentation appears normal  Physical Exam      (Note was completed, in part, with [employer] voice-recognition software. Documentation reviewed, but some grammatical, spelling, and word errors may remain.)  Joanne King MD on 10/26/2021 at 1:27 PM

## 2021-10-28 LAB — BACTERIA ABSC ANAEROBE+AEROBE CULT: ABNORMAL

## 2022-01-02 ENCOUNTER — HEALTH MAINTENANCE LETTER (OUTPATIENT)
Age: 37
End: 2022-01-02

## 2022-02-03 ENCOUNTER — ANCILLARY PROCEDURE (OUTPATIENT)
Dept: ULTRASOUND IMAGING | Facility: CLINIC | Age: 37
End: 2022-02-03
Attending: FAMILY MEDICINE
Payer: COMMERCIAL

## 2022-02-03 ENCOUNTER — OFFICE VISIT (OUTPATIENT)
Dept: PEDIATRICS | Facility: CLINIC | Age: 37
End: 2022-02-03
Attending: FAMILY MEDICINE
Payer: COMMERCIAL

## 2022-02-03 ENCOUNTER — VIRTUAL VISIT (OUTPATIENT)
Dept: FAMILY MEDICINE | Facility: CLINIC | Age: 37
End: 2022-02-03
Payer: COMMERCIAL

## 2022-02-03 VITALS
HEART RATE: 82 BPM | DIASTOLIC BLOOD PRESSURE: 81 MMHG | OXYGEN SATURATION: 97 % | RESPIRATION RATE: 18 BRPM | TEMPERATURE: 98.4 F | SYSTOLIC BLOOD PRESSURE: 126 MMHG

## 2022-02-03 DIAGNOSIS — I80.01 THROMBOPHLEBITIS OF SUPERFICIAL VEINS OF RIGHT LOWER EXTREMITY: ICD-10-CM

## 2022-02-03 DIAGNOSIS — I80.01 THROMBOPHLEBITIS OF SUPERFICIAL VEINS OF RIGHT LOWER EXTREMITY: Primary | ICD-10-CM

## 2022-02-03 PROCEDURE — 99215 OFFICE O/P EST HI 40 MIN: CPT | Performed by: FAMILY MEDICINE

## 2022-02-03 PROCEDURE — 99207 REFERRAL TO ACUTE AND DIAGNOSTIC SERVICES: CPT | Performed by: FAMILY MEDICINE

## 2022-02-03 PROCEDURE — 93971 EXTREMITY STUDY: CPT | Mod: RT | Performed by: RADIOLOGY

## 2022-02-03 RX ORDER — APIXABAN 5 MG (74)
KIT ORAL
Qty: 74 EACH | Refills: 0 | Status: SHIPPED | OUTPATIENT
Start: 2022-02-03 | End: 2022-03-05

## 2022-02-03 NOTE — PROGRESS NOTES
Assessment & Plan     Thrombophlebitis of superficial veins of right lower extremity  - Referral to Acute and Diagnostic Services (Day of diagnostic / First order acute)  - US Lower Extremity Venous Duplex Right  - Apixaban Starter Pack (ELIQUIS DVT/PE STARTER PACK) 5 MG TBPK  Dispense: 74 each; Refill: 0     Discussed with referring primary care physician, we will plan to have patient schedule an appointment in 1 week with his PCP. Given his previous history will elect to place him on blood thinners with the assumption of risk as  this could progress/worsen - Eliquis prescribed.     He is to do warm /cold compresses (Whichever feels better) to relieve discomfort in his leg    See AVS summary for additional recommendations reviewed with patient during this visit.       Follow-up with PCP next week    Phani De Leon MD   [facility]    Enedelia Saez is a 36 year old male who presents to clinic today for the following health issues:  Chief Complaint   Patient presents with     Musculoskeletal Problem     right leg     HPI    Patient is referred to us today from his primary care physician's office    2017 Hx of Greater saphenous thrombus/superficial thrombophlebitis had discussion with hematologist specialist for this in the past and was on lovenox-> warfarin for approximately 1 month    Patient presents today with redness and pain of thigh similar to his previous presentation - present for just under a week. No open wounds/injuries to this area.     REmedies attempted: none    Denies chest pain/shortness of breath. No lower leg swelling.       Patient Active Problem List    Diagnosis Date Noted     Thrombophlebitis of superficial veins of right lower extremity 08/14/2017     Priority: Medium     Greater saphenous vein.       Varicose veins of right leg with edema 03/13/2017     Priority: Medium     Anxiety 01/11/2014     Priority: Medium     CARDIOVASCULAR SCREENING; LDL GOAL LESS THAN 160  01/11/2014     Priority: Medium     Major depression 01/06/2014     Priority: Medium       Current Outpatient Medications   Medication     Apixaban Starter Pack (ELIQUIS DVT/PE STARTER PACK) 5 MG TBPK     No current facility-administered medications for this visit.           Objective    /81 (BP Location: Right arm, Patient Position: Sitting, Cuff Size: Adult Large)   Pulse 82   Temp 98.4  F (36.9  C) (Oral)   Resp 18   SpO2 97%   Physical Exam     R leg: medial thigh area a large area > 10x 15 cm of diffuse redness/firmness along venous system , visible varicose veins  Gait: normal  Pulm: non-labored  GEN: pleasant, no distress    Results for orders placed or performed in visit on 02/03/22   US Lower Extremity Venous Duplex Right     Status: None    Narrative    RIGHT LOWER EXTREMITY DUPLEX VENOUS ULTRASOUND 2/3/2022 3:50 PM    CLINICAL HISTORY: hx of great saphenous thrombosis/superficial  thrombophlebitis; Thrombophlebitis of superficial veins of right lower  extremity.     COMPARISONS: 8/14/2017    REFERRING PROVIDER: SHAMEKA EDWARDS    TECHNIQUE: Grayscale, color Doppler, Doppler waveform ultrasound  evaluation was performed through the right common femoral, femoral,  and popliteal veins. Right posterior tibial and peroneal veins were  evaluated with grayscale imaging and compression.    Left common femoral vein was evaluated for symmetry.    FINDINGS: Left common femoral vein is patent, fully compressible, and  demonstrates normal phasic Doppler waveform.    Right common femoral, femoral, and popliteal veins are fully  compressible, patent, and demonstrate normal phasic Doppler waveforms.    Right posterior tibial veins are fully compressible to the ankle.    Right peroneal veins are fully compressible to the distal calf.    The proximal right great saphenous vein is partially compressible near  the saphenofemoral junction in the upper thigh and noncompressible in  the mid/distal thigh through  proximal calf. No extension of thrombus  into the right common femoral vein.      Impression    IMPRESSION:   1. No deep venous thrombosis demonstrated in the RIGHT leg.  2. Superficial venous thrombophlebitis of the proximal and mid right  greater saphenous vein, similar to 2017.    I have personally reviewed the examination and initial interpretation  and I agree with the findings.    AURA MARTELL MD         SYSTEM ID:  [id number]         The use of Dragon/Sajanation services may have been used to construct the content in this note; any grammatical or spelling errors are non-intentional. Please contact the author of this note directly if you are in need of any clarification.

## 2022-02-03 NOTE — PROGRESS NOTES
HPI     Pain History:  When did you first notice your pain? - Less than 1 week   Have you seen anyone else for your pain? Yes - Primary Care  Where in your body do you have pain? Musculoskeletal problem/pain  Onset/Duration: Tuesday 2/1/22  Description  Location: leg - right  Joint Swelling: YES  Redness: YES  Pain: YES  Warmth: YES  Intensity:  moderate  Progression of Symptoms:  same  Accompanying signs and symptoms:   Fevers: no  Numbness/tingling/weakness: no  History  Trauma to the area: no  Recent illness:  no  Previous similar problem: YES, same leg, same spot on leg  Previous evaluation:  YES  Precipitating or alleviating factors:  Aggravating factors include: standing  Therapies tried and outcome: rest/inactivity and heat          Review of Systems     Objective    /81 (BP Location: Right arm, Patient Position: Sitting, Cuff Size: Adult Large)   Pulse 82   Temp 98.4  F (36.9  C) (Oral)   Resp 18   SpO2 97%   There is no height or weight on file to calculate BMI.  Physical Exam

## 2022-02-03 NOTE — PROGRESS NOTES
Se is a 36 year old who is being evaluated via a billable video visit.      How would you like to obtain your AVS? MyChart  If the video visit is dropped, the invitation should be resent by: Text to cell phone: [phone]  Will anyone else be joining your video visit? No    Video Start Time: 1:33 pm    Assessment & Plan   1. Thrombophlebitis of superficial veins of right lower extremity: Patient with similar symptoms as when he had thrombus of the great saphenous vein on the right in 2017.  Given patient's history he should have an ultrasound today to look at clot burden and need for/duration of anticoagulation.  He is willing to go to the [facility] today in [place].  I did call there in advance and they are willing to accept the patient as long as he arise prior to 3 PM.  He will leave now and is in agreement with plan.  - Referral to Acute and Diagnostic Services (Day of diagnostic / First order acute); Future    Jareth Aguilar MD  [facility]    This chart is completed utilizing dictation software; typos and/or incorrect word substitutions may unintentionally occur.       Subjective   Se is a 36 year old who presents for the following health issues    HPI     Patient reports history of prior thrombosis in right lower extremity. Chart review showed this occurred in March of 2017 and again in August 2017. He was placed on Lovenox per the ER note in August 2017. He states he was on warfarin after that, but I do not have records.    States he typically has varicose veins in this area. No trauma. No chest pain or shortess of breath.    He developed pain, redness, and firm feeling of the vein on upper right thigh.    Lives in [place] and would be willing to drive to [place] [facility] today if they can get him in.    Review of Systems   Constitutional, HEENT, cardiovascular, pulmonary, gi and gu systems are negative, except as otherwise noted.      Objective       Vitals:  No  vitals were obtained today due to virtual visit.    Physical Exam   GENERAL: Healthy, alert and no distress  EYES: Eyes grossly normal to inspection.  No discharge or erythema, or obvious scleral/conjunctival abnormalities.  RESP: No audible wheeze, cough, or visible cyanosis.  No visible retractions or increased work of breathing.    SKIN: Redness on right inner thigh. Visible skin clear. No significant rash, abnormal pigmentation or lesions.  NEURO: Cranial nerves grossly intact.  Mentation and speech appropriate for age.  PSYCH: Mentation appears normal, affect normal/bright, judgement and insight intact, normal speech and appearance well-groomed.    Labs: none        Video-Visit Details    Type of service:  Video Visit    Video End Time:1:42 pm    Originating Location (pt. Location): Home    Distant Location (provider location):  [facility]     Platform used for Video Visit: Vital Connect

## 2022-03-02 NOTE — PATIENT INSTRUCTIONS
Take the Eliquis/Apixiban (blood thinner) medication 10mg twice a day for 1 week  - the following week and going forward take 5mg twice a day       Follow-up appointment next week with Dr. Adan    Patient Education     Superficial Thrombophlebitis   The superficial veins are the veins near the surface of the skin. Superficial thrombophlebitis is a problem that occurs when one or more of these veins become red, irritated, and swollen. This is most often because of a blood clot.   Causes  The problem may occur after injury to a vein. It may also occur after having an intravenous (IV) line placed. Other factors that can make the problem more likely include:     Varicose veins    Venous insufficiency    Bleeding disorders    Prolonged periods of rest and not moving around    IV drug abuse    Pregnancy    Use of birth control pills or estrogen therapy  Symptoms  Symptoms may appear in the affected area. They can include:    Pain    Tenderness    Redness    Warmth    Swelling    Hardening of the vein  In most cases, superficial thrombophlebitis resolves on its own with no problems. Treatment is focused on relieving symptoms.   Sometimes, there is a risk that the deep veins in the body may also be involved. This can lead to more serious problems. In such cases, further testing and treatments may be needed. Your healthcare provider can tell you more about this.   Home care  To help relieve pain and swelling, you may be told to:    Apply heat or cold to the affected area. Do this for up to 10 minutes as often as directed.  ? Heat: Use a warm compress, such as a heating pad.  ? Cold: Use a cold compress, such as a cold pack or bag of ice wrapped in a thin towel.        Keep the affected limb (arm or leg) raised above heart level as directed.    Wear elastic compression stockings or bandages as directed.    Don't sit or stand for long periods. Get up and walk often.  To help treat a blood clot, a blood thinner (anticoagulant)  Detail Level: Detailed may be prescribed. If this is needed, be sure to take the medicine exactly as directed.   Follow-up care  Follow up with your healthcare provider as advised. If imaging tests are done, they will be reviewed by a doctor. You ll be told the results and any new findings that may affect your treatment.   When to seek medical advice  Call your healthcare provider right away if any of these occur:    Fever of 100.4 F (38 C) or higher, or as directed by your healthcare provider    Increasing pain, swelling, or tenderness in the affected area    Spreading warmth or redness in the affected area  Call 911  Call 911 if any of these occur:     Trouble breathing    Chest pain or discomfort that worsens with deep breathing or coughing    Coughing (may cough up blood)    Fast or irregular heartbeat    Sweating    Anxiety    Lightheadedness, dizziness, or fainting    Extreme confusion    Extreme drowsiness or trouble waking up    New pain in the chest, arm, shoulder, neck, or upper back  StayWell last reviewed this educational content on 4/1/2018 2000-2021 [employer]. All rights reserved. This information is not intended as a substitute for professional medical care. Always follow your healthcare professional's instructions.            Add 16243 Cpt? (Important Note: In 2017 The Use Of 79177 Is Being Tracked By Cms To Determine Future Global Period Reimbursement For Global Periods): yes

## 2022-07-13 ENCOUNTER — OFFICE VISIT (OUTPATIENT)
Dept: FAMILY MEDICINE | Facility: CLINIC | Age: 37
End: 2022-07-13
Payer: COMMERCIAL

## 2022-07-13 VITALS
OXYGEN SATURATION: 94 % | HEART RATE: 76 BPM | HEIGHT: 70 IN | BODY MASS INDEX: 45.1 KG/M2 | RESPIRATION RATE: 16 BRPM | WEIGHT: 315 LBS | DIASTOLIC BLOOD PRESSURE: 75 MMHG | SYSTOLIC BLOOD PRESSURE: 118 MMHG | TEMPERATURE: 98.3 F

## 2022-07-13 DIAGNOSIS — B35.1 ONYCHOMYCOSIS: Primary | ICD-10-CM

## 2022-07-13 PROCEDURE — 99213 OFFICE O/P EST LOW 20 MIN: CPT | Performed by: FAMILY MEDICINE

## 2022-07-13 RX ORDER — CICLOPIROX 80 MG/ML
SOLUTION TOPICAL
Qty: 6 ML | Refills: 3 | Status: SHIPPED | OUTPATIENT
Start: 2022-07-13 | End: 2022-12-15

## 2022-07-13 ASSESSMENT — ANXIETY QUESTIONNAIRES
GAD7 TOTAL SCORE: 0
5. BEING SO RESTLESS THAT IT IS HARD TO SIT STILL: NOT AT ALL
GAD7 TOTAL SCORE: 0
7. FEELING AFRAID AS IF SOMETHING AWFUL MIGHT HAPPEN: NOT AT ALL
4. TROUBLE RELAXING: NOT AT ALL
2. NOT BEING ABLE TO STOP OR CONTROL WORRYING: NOT AT ALL
3. WORRYING TOO MUCH ABOUT DIFFERENT THINGS: NOT AT ALL
1. FEELING NERVOUS, ANXIOUS, OR ON EDGE: NOT AT ALL
GAD7 TOTAL SCORE: 0
8. IF YOU CHECKED OFF ANY PROBLEMS, HOW DIFFICULT HAVE THESE MADE IT FOR YOU TO DO YOUR WORK, TAKE CARE OF THINGS AT HOME, OR GET ALONG WITH OTHER PEOPLE?: NOT DIFFICULT AT ALL
6. BECOMING EASILY ANNOYED OR IRRITABLE: NOT AT ALL
7. FEELING AFRAID AS IF SOMETHING AWFUL MIGHT HAPPEN: NOT AT ALL

## 2022-07-13 ASSESSMENT — PATIENT HEALTH QUESTIONNAIRE - PHQ9
10. IF YOU CHECKED OFF ANY PROBLEMS, HOW DIFFICULT HAVE THESE PROBLEMS MADE IT FOR YOU TO DO YOUR WORK, TAKE CARE OF THINGS AT HOME, OR GET ALONG WITH OTHER PEOPLE: NOT DIFFICULT AT ALL
SUM OF ALL RESPONSES TO PHQ QUESTIONS 1-9: 2
SUM OF ALL RESPONSES TO PHQ QUESTIONS 1-9: 2

## 2022-07-13 NOTE — PROGRESS NOTES
"  Assessment & Plan     Onychomycosis  Almost all nails are affected.  Asymptomatic.  Seeking treatment mostly due to cosmetic purpose.  Discussed topical versus oral treatment.  Discussed very limited efficacy to topical medication due to widespread nature of his condition.  Discussed oral medication and its side effects.  Discussed recurrence rate.  Discussed treatment failure.  Discussed hepatic damage risk and frequent monitoring while on oral medication.  He would like to think about oral medication but at this point would like to try a topical medication.  Discussed it would be reasonable thing to try but chances of success is fairly low.  How to use discussed.  Continue for 4 months to see benefit.  - ciclopirox (PENLAC) 8 % external solution; Apply to adjacent skin and affected nails daily.  Remove with alcohol every 7 days, then repeat.             BMI:   Estimated body mass index is 46.19 kg/m  as calculated from the following:    Height as of this encounter: 1.77 m (5' 9.69\").    Weight as of this encounter: 144.7 kg (319 lb).       Return in about 1 year (around 7/13/2023).    Nathan Rhodes MD, MD  [facility]    Enedelia Saez is a 37 year old, presenting for the following health issues:  Toenail      Toenail    History of Present Illness       Reason for visit:  Toenail fungus  Symptom onset:  More than a month  Symptoms include:  Fungus  Symptom intensity:  Moderate  Symptom progression:  Worsening  Had these symptoms before:  Yes  Has tried/received treatment for these symptoms:  No  What makes it worse:  No  What makes it better:  No    He eats 2-3 servings of fruits and vegetables daily.He consumes 1 sweetened beverage(s) daily.He exercises with enough effort to increase his heart rate 30 to 60 minutes per day.  He exercises with enough effort to increase his heart rate 4 days per week.   He is taking medications regularly.    Today's PHQ-9         PHQ-9 " "Total Score: 2    PHQ-9 Q9 Thoughts of better off dead/self-harm past 2 weeks :   Not at all    How difficult have these problems made it for you to do your work, take care of things at home, or get along with other people: Not difficult at all  Today's FANG-7 Score: 0     All toe nails are affected. Been there for many years.   havent done any treatment.   Fingers are fine.     Review of Systems         Objective    /75 (BP Location: Left arm, Patient Position: Chair, Cuff Size: Adult Large)   Pulse 76   Temp 98.3  F (36.8  C) (Temporal)   Resp 16   Ht 1.77 m (5' 9.69\")   Wt 144.7 kg (319 lb)   SpO2 94%   BMI 46.19 kg/m    Body mass index is 46.19 kg/m .  Physical Exam   Except for left 4th toe all toe nails are completely affected by onychomysosis and thickened and deformed.                     .  ..  "

## 2022-09-11 ENCOUNTER — E-VISIT (OUTPATIENT)
Dept: URGENT CARE | Facility: CLINIC | Age: 37
End: 2022-09-11
Payer: COMMERCIAL

## 2022-09-11 DIAGNOSIS — L03.311 ABDOMINAL WALL CELLULITIS: Primary | ICD-10-CM

## 2022-09-11 PROCEDURE — 99421 OL DIG E/M SVC 5-10 MIN: CPT | Performed by: NURSE PRACTITIONER

## 2022-09-11 RX ORDER — DOXYCYCLINE 100 MG/1
100 CAPSULE ORAL 2 TIMES DAILY
Qty: 20 CAPSULE | Refills: 0 | Status: SHIPPED | OUTPATIENT
Start: 2022-09-11 | End: 2022-12-15

## 2022-10-30 ENCOUNTER — HEALTH MAINTENANCE LETTER (OUTPATIENT)
Age: 37
End: 2022-10-30

## 2022-12-15 ENCOUNTER — VIRTUAL VISIT (OUTPATIENT)
Dept: FAMILY MEDICINE | Facility: CLINIC | Age: 37
End: 2022-12-15
Payer: COMMERCIAL

## 2022-12-15 DIAGNOSIS — K42.9 UMBILICAL HERNIA WITHOUT OBSTRUCTION AND WITHOUT GANGRENE: Primary | ICD-10-CM

## 2022-12-15 PROBLEM — I80.01 THROMBOPHLEBITIS OF SUPERFICIAL VEINS OF RIGHT LOWER EXTREMITY: Status: RESOLVED | Noted: 2017-08-14 | Resolved: 2022-12-15

## 2022-12-15 PROCEDURE — 99213 OFFICE O/P EST LOW 20 MIN: CPT | Mod: 95 | Performed by: FAMILY MEDICINE

## 2022-12-15 RX ORDER — APIXABAN 5 MG/1
TABLET, FILM COATED ORAL
COMMUNITY
Start: 2022-02-03 | End: 2022-12-15

## 2022-12-15 ASSESSMENT — PATIENT HEALTH QUESTIONNAIRE - PHQ9
SUM OF ALL RESPONSES TO PHQ QUESTIONS 1-9: 1
SUM OF ALL RESPONSES TO PHQ QUESTIONS 1-9: 1
10. IF YOU CHECKED OFF ANY PROBLEMS, HOW DIFFICULT HAVE THESE PROBLEMS MADE IT FOR YOU TO DO YOUR WORK, TAKE CARE OF THINGS AT HOME, OR GET ALONG WITH OTHER PEOPLE: NOT DIFFICULT AT ALL

## 2022-12-15 NOTE — PROGRESS NOTES
"Se is a 37 year old who is being evaluated via a billable video visit.      How would you like to obtain your AVS? MyChart  If the video visit is dropped, the invitation should be resent by: Text to cell phone: [phone]  Will anyone else be joining your video visit? No        Assessment & Plan   Umbilical hernia without obstruction and without gangrene  New diagnosis, minimally symptomatic and offered really referral to surgery versus observation and he would like to observe at this time.  See patient instructions as well.  Also recommended weight loss as that will help improve his symptoms.      BMI:   Estimated body mass index is 46.19 kg/m  as calculated from the following:    Height as of 7/13/22: 1.77 m (5' 9.69\").    Weight as of 7/13/22: 144.7 kg (319 lb).   Weight management plan: Discussed healthy diet and exercise guidelines      Return in about 1 month (around 1/15/2023) for symptoms failing to improve or sooner if worsening.      Mikey Joshua MD      [address] [address]  [employer]   Office: [phone]       Subjective   Se is a 37 year old, presenting for the following health issues:  Video Visit      History of Present Illness       Reason for visit:  Umbilical hernia  Symptom onset:  3-4 weeks ago  Symptoms include:  Belly button looks weird and pushed out maybe  Symptom intensity:  Mild - no pain  Symptom progression:  Staying the same  Had these symptoms before:  No    He eats 2-3 servings of fruits and vegetables daily.He consumes 0 sweetened beverage(s) daily.He exercises with enough effort to increase his heart rate 30 to 60 minutes per day.  He exercises with enough effort to increase his heart rate 4 days per week.   He is taking medications regularly.    Today's PHQ-9         PHQ-9 Total Score: 1    PHQ-9 Q9 Thoughts of better off dead/self-harm past 2 weeks :   Not at all    How difficult have these problems made it for " you to do your work, take care of things at home, or get along with other people: Not difficult at all     Review of Systems       Objective           Vitals:  No vitals were obtained today due to virtual visit.    Physical Exam   GENERAL: Healthy, alert and no distress  EYES: Eyes grossly normal to inspection.  No discharge or erythema, or obvious scleral/conjunctival abnormalities.  RESP: No audible wheeze, cough, or visible cyanosis.  No visible retractions or increased work of breathing.    SKIN: small umbilical hernia defect noted on video. otherwise Visible skin clear. No significant rash, abnormal pigmentation or lesions.  NEURO: Cranial nerves grossly intact.  Mentation and speech appropriate for age.  PSYCH: Mentation appears normal, affect normal/bright, judgement and insight intact, normal speech and appearance well-groomed.              Video-Visit Details    Video Start Time: 11:19 AM    Type of service:  Video Visit    Video End Time:11:31 AM    Originating Location (pt. Location): Home    Distant Location (provider location):  On-site    Platform used for Video Visit: Kirti

## 2023-04-08 ENCOUNTER — HEALTH MAINTENANCE LETTER (OUTPATIENT)
Age: 38
End: 2023-04-08

## 2023-05-16 ENCOUNTER — OFFICE VISIT (OUTPATIENT)
Dept: INTERNAL MEDICINE | Facility: CLINIC | Age: 38
End: 2023-05-16
Payer: COMMERCIAL

## 2023-05-16 VITALS
HEIGHT: 71 IN | TEMPERATURE: 97.8 F | OXYGEN SATURATION: 97 % | BODY MASS INDEX: 44.1 KG/M2 | RESPIRATION RATE: 18 BRPM | SYSTOLIC BLOOD PRESSURE: 111 MMHG | WEIGHT: 315 LBS | HEART RATE: 75 BPM | DIASTOLIC BLOOD PRESSURE: 63 MMHG

## 2023-05-16 DIAGNOSIS — K42.9 UMBILICAL HERNIA WITHOUT OBSTRUCTION AND WITHOUT GANGRENE: Primary | ICD-10-CM

## 2023-05-16 DIAGNOSIS — S49.92XA INJURY OF LEFT SHOULDER, INITIAL ENCOUNTER: ICD-10-CM

## 2023-05-16 PROCEDURE — 99213 OFFICE O/P EST LOW 20 MIN: CPT | Performed by: INTERNAL MEDICINE

## 2023-05-16 ASSESSMENT — PATIENT HEALTH QUESTIONNAIRE - PHQ9
SUM OF ALL RESPONSES TO PHQ QUESTIONS 1-9: 1
10. IF YOU CHECKED OFF ANY PROBLEMS, HOW DIFFICULT HAVE THESE PROBLEMS MADE IT FOR YOU TO DO YOUR WORK, TAKE CARE OF THINGS AT HOME, OR GET ALONG WITH OTHER PEOPLE: NOT DIFFICULT AT ALL
SUM OF ALL RESPONSES TO PHQ QUESTIONS 1-9: 1

## 2023-05-16 NOTE — PROGRESS NOTES
ASSESSMENT AND PLAN:    1. Umbilical hernia without obstruction and without gangrene  Easily reducible, no significant pain. We discussed the low but definit risks of incarceration and strangulation, and risks of being 2 days away from surgical care.  He has no plans for that and knows he can seek surgical evaluation whenever.  We also discussed that weight loss would help with healing and help reduce risk of it enlarging.     2. Injury of left shoulder, initial encounter  Exam is most consistent with AC or glenohumeral contusion.  Has good ROM without pain today, and likely will resolve with expectant approach.  No indication of rotator cuff, or fracture on exam.  Reassured.     3. Obesity  We discussed weight target, and diet methods, and long term risks.     Follow up next scheduled visit    CHIEF COMPLAINT:  Shoulder pain, and umbilical hernia    HISTORY OF PRESENT ILLNESS:  Se Robins is a 38 year old male with fall playing softball 3 weeks ago, onto left side and shoulder without outstretched arm.  Gradually improving, he still plays and does work, without significant pain, but some pain still at night at times.  No numbness or weakness of arm or neck pain.  Umbilical hernia is off and on, and doesn't hurt.     REVIEW OF SYSTEMS:   See HPI, all other systems on review are negative.    Past Medical History:   Diagnosis Date     Anxiety      Lymphangioma     per pt this was the diagnosis of his skin below the axilla on the right     Major depression 1/6/2014     Thrombophlebitis of superficial veins of right lower extremity 8/14/2017    Greater saphenous vein.     History   Smoking Status     Former     Packs/day: 1.00     Types: Cigarettes   Smokeless Tobacco     Never     Family History   Problem Relation Age of Onset     Arthritis Mother      Depression Mother      Cerebrovascular Disease Mother      Alcohol/Drug Father      Depression Father      Substance Abuse Father      Alcohol/Drug Brother       "Depression Brother      Schizophrenia Brother      Bipolar Disorder Brother      Substance Abuse Brother      Schizophrenia Brother      Bipolar Disorder Brother      No past surgical history on file.  VITALS:  Vitals:    05/16/23 1021   BP: 111/63   BP Location: Left arm   Patient Position: Sitting   Cuff Size: Adult Large   Pulse: 75   Resp: 18   Temp: 97.8  F (36.6  C)   TempSrc: Tympanic   SpO2: 97%   Weight: 146.6 kg (323 lb 4.8 oz)   Height: 1.803 m (5' 11\")     Wt Readings from Last 3 Encounters:   05/16/23 146.6 kg (323 lb 4.8 oz)   07/13/22 144.7 kg (319 lb)   08/14/17 (!) 144.2 kg (318 lb)     PHYSICAL EXAM:  Constitutional:  In NAD, alert and oriented  Abdomen:  Palpable, and easily reducible 2 cm hernia  Extremities: no focal area of tenderness in the shoulder, good ROM without pain, AC joint has mild separation.    Se Plascencia MD  Internal Medicine  [facility]  "

## 2024-02-15 ENCOUNTER — TELEPHONE (OUTPATIENT)
Dept: INTERNAL MEDICINE | Facility: CLINIC | Age: 39
End: 2024-02-15

## 2024-02-15 ENCOUNTER — VIRTUAL VISIT (OUTPATIENT)
Dept: INTERNAL MEDICINE | Facility: CLINIC | Age: 39
End: 2024-02-15
Payer: COMMERCIAL

## 2024-02-15 DIAGNOSIS — L21.9 SEBORRHEIC DERMATITIS: Primary | ICD-10-CM

## 2024-02-15 DIAGNOSIS — Z00.00 PREVENTATIVE HEALTH CARE: ICD-10-CM

## 2024-02-15 DIAGNOSIS — E66.813 CLASS 3 OBESITY: ICD-10-CM

## 2024-02-15 PROCEDURE — 99213 OFFICE O/P EST LOW 20 MIN: CPT | Mod: 95 | Performed by: INTERNAL MEDICINE

## 2024-02-15 RX ORDER — KETOCONAZOLE 20 MG/G
CREAM TOPICAL
Qty: 30 G | Refills: 3 | Status: SHIPPED | OUTPATIENT
Start: 2024-02-15

## 2024-02-15 RX ORDER — HYDROCORTISONE 2.5 %
CREAM (GRAM) TOPICAL
Qty: 30 G | Refills: 3 | Status: SHIPPED | OUTPATIENT
Start: 2024-02-15

## 2024-02-15 NOTE — PROGRESS NOTES
Se is a 38 year old male contacting the clinic today via video, who will use the platform: MOOI for the visit.  Phone # for Doximity, or if Amwell drops:   Telephone Information:   Mobile [phone]          ASSESSMENT and PLAN:  1. Seborrheic dermatitis  Presumptive.  Prescribed ketoconazole plus hydrocortisone.  Dermatology if no improvement.  Up-to-date reviewed.  Provided prescription for Roflumilast, which is current best recommendation  - Roflumilast, Antiseborrheic, 0.3 % FOAM; Externally apply 1 Application topically daily Apply once daily  Dispense: 60 g; Refill: 3  - Adult Dermatology  Referral; Future  - ketoconazole (NIZORAL) 2 % external cream; MIX 1:1 WITH 2.5 % HYDROCORTISONE CREAM AND APPLY TO AFFECTED AREAS TWICE DAILY FOR UP TO 14 DAYS  Dispense: 30 g; Refill: 3  - hydrocortisone 2.5 % cream; MIX 1:1 WITH 2% Ketoconazole CREAM AND APPLY TO AFFECTED AREAS TWICE DAILY FOR UP TO 14 DAYS  Dispense: 30 g; Refill: 3    2. Class 3 obesity (H)  Noted.  Encouraged exercise    3. Preventative health care  Up-to-date  - REVIEW OF HEALTH MAINTENANCE PROTOCOL ORDERS       Patient Instructions   Presumptive seborrheic dermatitis    Mixture of ketoconazole 2% cream with hydrocortisone 2.5% cream once to twice daily for 14 days sent    Roflumilast foam once daily if insurance covers    Dermatology referral if no improvement    Preventive care briefly reviewed            Return if symptoms worsen or fail to improve, for using a video visit.       CHIEF COMPLAINT:  Chief Complaint   Patient presents with    Derm Problem     Pt c/o skin peeling on foehead, sides of face and ears for last few months.           2/15/2024    10:02 AM   Additional Questions   Roomed by Manuel IVAN RN       HISTORY OF PRESENT ILLNESS:  Se is a 38 year old male contacting the clinic today via video for complaints of rash.  He reports a rash in his forehead that began about 3 months ago.  This is over his eyebrows, a little  "bit in his eyebrows, extending on his temples back to his ears and slightly on the bridge of his nose.  Moisturizers have been ineffective.  Exposure to healthcare system minimal.  No antibiotics, antifungals, surgeries or any other interventions in the last year.  Takes no medication      History of Present Illness       Reason for visit:  Skin peeling on face  Symptom onset:  More than a month  Symptoms include:  Skin peeling on forehead, sides of face and ears  Symptom intensity:  Moderate  Symptom progression:  Worsening  Had these symptoms before:  No  What makes it worse:  Nothing  What makes it better:  Moisturizers    He eats 2-3 servings of fruits and vegetables daily.He consumes 1 sweetened beverage(s) daily.He exercises with enough effort to increase his heart rate 30 to 60 minutes per day.  He exercises with enough effort to increase his heart rate 4 days per week.   He is taking medications regularly.      REVIEW OF SYSTEMS:   None    PFSH:  Social History     Social History Narrative    Not on file       TOBACCO USE:  History   Smoking Status    Former    Packs/day: 1.00    Types: Cigarettes   Smokeless Tobacco    Never       VITALS:  There were no vitals filed for this visit.  There were no vitals taken for this visit. Estimated body mass index is 45.09 kg/m  as calculated from the following:    Height as of 5/16/23: 1.803 m (5' 11\").    Weight as of 5/16/23: 146.6 kg (323 lb 4.8 oz).    PHYSICAL EXAM:  (observations via Video)  Alert and oriented.  Slight rash above eyebrows suggestive of seborrheic dermatitis    MEDICATIONS:   Current Outpatient Medications   Medication Sig Dispense Refill    hydrocortisone 2.5 % cream MIX 1:1 WITH 2% Ketoconazole CREAM AND APPLY TO AFFECTED AREAS TWICE DAILY FOR UP TO 14 DAYS 30 g 3    ketoconazole (NIZORAL) 2 % external cream MIX 1:1 WITH 2.5 % HYDROCORTISONE CREAM AND APPLY TO AFFECTED AREAS TWICE DAILY FOR UP TO 14 DAYS 30 g 3    Roflumilast, Antiseborrheic, 0.3 " "% FOAM Externally apply 1 Application topically daily Apply once daily 60 g 3       Outside Notes summarized:   Labs, x-rays, cardiology, GI tests reviewed:   No results for input(s): \"HGB\", \"WBC\", \"NA\", \"POTASSIUM\", \"CR\", \"A1C\", \"PSA\", \"URIC\", \"B12\", \"TSH\", \"VITDT\", \"SED\", \"CRPI\" in the last 26760 hours.  No results found for: \"KVBOS19JXY\"  No results found for: \"CHOL\"  New orders:   Orders Placed This Encounter   Procedures    REVIEW OF HEALTH MAINTENANCE PROTOCOL ORDERS    Adult Dermatology [facility] Referral       Independent review of:     Phani Cheung MD  [facility]    Video-Visit Details  Type of service:  Video Visit  Patient has given verbal consent to a Video visit?  Yes  How would you like to obtain your AVS?  MyChart  Will anyone else be joining your video visit, giving supplemental history? No    Originating location (pt location): Home    Distant Location (provider location):  Off-site    Video Start Time: 10:53 AM  Video End time:  11:12 AM  Face to face plus orders: 19 minutes  Documentation time:  3 minutes     The visit lasted a total of 22 minutes       "

## 2024-02-15 NOTE — TELEPHONE ENCOUNTER
[facility] Prior Authorization Team Request    Medication:   Roflumilast, Antiseborrheic, 0.3 % FOAM 60 g 3 2/15/2024 2/14/2025 No   Sig - Route: Externally apply 1 Application topically daily Apply once daily - Apply externally        NDC (required for Medicaid members):   Insurance Company: [employer]  BIN:   PCN:   Grp:   ID: [id number]  CoverMyMeds Key (if applicable):   Additional documentation:   Pharmacy Filling the Rx:   [id number] [address] AT [address] [phone]           Contact: P PHARM [place] ([phone]) please send all responses to this pool.  Pharmacy NPI (required for Medicaid members):

## 2024-02-15 NOTE — PATIENT INSTRUCTIONS
Presumptive seborrheic dermatitis    Mixture of ketoconazole 2% cream with hydrocortisone 2.5% cream once to twice daily for 14 days sent    Roflumilast foam once daily if insurance covers    Dermatology referral if no improvement    Preventive care briefly reviewed

## 2024-02-15 NOTE — PROGRESS NOTES
"Se is a 38 year old who is being evaluated via a billable video visit.      How would you like to obtain your AVS? MyChart  If the video visit is dropped, the invitation should be resent by: Text to cell phone: [phone]  Will anyone else be joining your video visit? No  {If patient encounters technical issues they should call [phone] :650146}        {PROVIDER CHARTING PREFERENCE:100739}    Subjective   Se is a 38 year old, presenting for the following health issues:  Derm Problem (Pt c/o skin peeling on foehead, sides of face and ears for last few months.)      2/15/2024    10:02 AM   Additional Questions   Roomed by Manuel IVAN RN     History of Present Illness       Reason for visit:  Skin peeling on face  Symptom onset:  More than a month  Symptoms include:  Skin peeling on forehead, sides of face and ears  Symptom intensity:  Moderate  Symptom progression:  Worsening  Had these symptoms before:  No  What makes it worse:  Nothing  What makes it better:  Moisturizers    He eats 2-3 servings of fruits and vegetables daily.He consumes 1 sweetened beverage(s) daily.He exercises with enough effort to increase his heart rate 30 to 60 minutes per day.  He exercises with enough effort to increase his heart rate 4 days per week.   He is taking medications regularly.       {SUPERLIST (Optional):815700}  {additonal problems for provider to add (Optional):591933}    {ROS Picklists (Optional):306440}      Objective    Vitals - Patient Reported  Weight (Patient Reported): 122.5 kg (270 lb)  Height (Patient Reported): 180.3 cm (5' 11\")  BMI (Based on Pt Reported Ht/Wt): 37.66  Pain Score: No Pain (0)        Physical Exam   {video visit exam brief selected:418454}    {Diagnostic Test Results (Optional):392387}      Video-Visit Details    Type of service:  Video Visit   Video Start Time: {video visit start/end time for provider to select:664703}  Video End Time:{video visit start/end time for provider to " "select:301755}    Originating Location (pt. Location): {video visit patient location:807302::\"Home\"}  {PROVIDER LOCATION On-site should be selected for visits conducted from your clinic location or adjoining [facility] hospital, academic office, or other nearby [facility] building. Off-site should be selected for all other provider locations, including home:081131}  Distant Location (provider location):  {virtual location provider:027442}  Platform used for Video Visit: {Virtual Visit Platforms:125568::\"AXADO\"}  Signed Electronically by: Phani Cheung MD  {Email feedback regarding this note to [email or web address]   :352834}  "

## 2024-02-29 NOTE — TELEPHONE ENCOUNTER
Operative Note       Surgery Date: 7/1/2019     Surgeon(s) and Role:     * Thierry Hudson MD - Primary     * Bk Campuzano MD - Assisting     * Abdullahi Medina [id number]  SHUNT CHPV INLINE SIPHON  [employer] [id number] Right 1   KIT CATH WITH BACTISEAL - [id number]  KIT CATH WITH BACTISEAL  [facility] [facility] [id number] Right 1              Disposition: PACU - hemodynamically stable.           Condition: Good    Attestation:  I was present and scrubbed for the entire procedure.     PA Initiation    Medication: ZORYVE 0.3 % EX FOAM  Insurance Company: [employer] - Phone [phone] Fax [phone]  Pharmacy Filling the Rx: [id number] - [place], [place] - [address] AT [facility] [address]  Filling Pharmacy Phone: [phone]  Filling Pharmacy Fax:    Start Date: 2/29/2024

## 2024-03-01 NOTE — TELEPHONE ENCOUNTER
PRIOR AUTHORIZATION DENIED    Medication: ZORYVE 0.3 % EX FOAM  Insurance Company: [employer] - Phone [phone] Fax [phone]  Denial Date: 3/1/2024  Denial Reason(s): Needs to try one more formulary alternative: Tacrolimus, Ciclopirox, Pimecrolimus      Appeal Information:   Patient Notified: No

## 2024-06-09 ENCOUNTER — HEALTH MAINTENANCE LETTER (OUTPATIENT)
Age: 39
End: 2024-06-09

## 2024-08-27 ENCOUNTER — OFFICE VISIT (OUTPATIENT)
Dept: PODIATRY | Facility: CLINIC | Age: 39
End: 2024-08-27
Payer: COMMERCIAL

## 2024-08-27 ENCOUNTER — ANCILLARY PROCEDURE (OUTPATIENT)
Dept: GENERAL RADIOLOGY | Facility: CLINIC | Age: 39
End: 2024-08-27
Attending: PODIATRIST
Payer: COMMERCIAL

## 2024-08-27 VITALS — BODY MASS INDEX: 45.09 KG/M2 | SYSTOLIC BLOOD PRESSURE: 112 MMHG | DIASTOLIC BLOOD PRESSURE: 64 MMHG | HEIGHT: 71 IN

## 2024-08-27 DIAGNOSIS — M71.072 ABSCESS OF BURSA OF LEFT FOOT: ICD-10-CM

## 2024-08-27 DIAGNOSIS — M79.672 LEFT FOOT PAIN: Primary | ICD-10-CM

## 2024-08-27 PROCEDURE — 73630 X-RAY EXAM OF FOOT: CPT | Mod: LT | Performed by: STUDENT IN AN ORGANIZED HEALTH CARE EDUCATION/TRAINING PROGRAM

## 2024-08-27 PROCEDURE — 99204 OFFICE O/P NEW MOD 45 MIN: CPT | Performed by: PODIATRIST

## 2024-08-27 NOTE — PATIENT INSTRUCTIONS
Thank you for choosing [facility] / Foot & Ankle Surgery!    DR. BALDERRAMA'S CLINIC LOCATIONS:     [facility] TRIAGE LINE: [phone]   [address] [place] [address] APPOINTMENTS: [phone]   [place] [address] RADIOLOGY: [phone]   (Every other Tues - Wed - Fri PM) SET UP SURGERY: [phone]    PHYSICAL THERAPY: [phone]   [place] SPECIALTY BILLING QUESTIONS: [phone] [id number] FAX: [phone]   [place] [address]    (Thurs & Fri AM)         PRICE THERAPY  Many aches and pains throughout the foot and ankle can be helped with many simple treatments. This is usually described as PRICE Therapy.      P - Protection - often times, inflammation/pain in the lower extremity is not able to improve simply because the areas involved are never allowed to rest. Every step we take can bother the problematic area. Protecting those areas is an important step in the healing process. This may involve a walking cast boot, a special insert/orthotic device, an ankle brace, or simply avoiding barefoot walking.    R - Rest - in addition to protecting the foot/ankle, resting is an important, but often times difficult, treatment option. Getting off your feet when they bother you, and specifically avoiding activities that cause pain/discomfort, are very beneficial to prevent, and treat, foot/ankle pain.      I - Ice - icing regularly can help to decrease inflammation and swelling in the foot, thus decreasing pain. Using an ice pack or a bag of frozen veggies works very well. Ice for 20 minutes multiple times per day as needed.  Do not place the ice directly on the skin as this can cause tissue damage.    C - Compression - using a compression wrap or an ACE wrap can help to decrease swelling, which can help to decrease pain. Wearing the wraps is generally not needed at night, but they should be worn on a regular basis when you are going to be on your feet for prolonged periods as gravity  tends to pull fluids down to your feet/ankles.    E - Elevation - elevating your lower extremities multiple times daily for 15-20 minutes can help to decrease swelling, which works well in decreasing pain levels.    NSAID/Tylenol - Anti-inflammatories like Aleve or ibuprofen, and/or a pain medication, such as Tylenol, can help to improve pain levels and get the issue resolved sooner rather than later. Anyone with liver issues should be careful with Tylenol, and anyone with high blood pressure or heart, stomach or kidney issues should be careful with anti-inflammatories. Please ask if you have questions about these medications, including dosage.     no

## 2024-08-27 NOTE — LETTER
8/27/2024      Se Robins  [address]  [facility] [address]      Dear Colleague,    Thank you for referring your patient, Se Robins, to the [facility]. Please see a copy of my visit note below.    ASSESSMENT:  Encounter Diagnoses   Name Primary?     Left foot pain Yes     Abscess of bursa of left foot      MEDICAL DECISION MAKING:  I personally reviewed the left foot x-ray images.  Soft tissue swelling or thickening appreciated around the left fifth metatarsal head region.  No underlying osseous abnormalities.    Certainly this presents in the location of the tailor's bunion yet his pain is somewhat out of proportion for mechanical pain.  I question an associated adventitious bursa or even abscess.  There is no erythema in this region.  If he has an abscess, there is risk of progressive infection.  He is to inform us if there is any drainage at any time.    I offered an attempt at incision and drainage and he understandably reluctant, due to the pain.    Recommendations:  Short cam walker for pain relief  Present to the emergency department if worsening  Stat MRI ordered to rule out a subcutaneous abscess of the left foot  If abscess, will direct to the emergency department for IV antibiotics and incision and drainage.  White blood cell pending  Rest, elevation ice    Follow-up for recheck in 1 to 2 weeks.    Disclaimer: This note consists of symbols derived from keyboarding, dictation and/or voice recognition software. As a result, there may be errors in the script that have gone undetected. Please consider this when interpreting information found in this chart.    Denzel Sellers, LOCO, FACFAS, MS    [place] Department of Podiatry/Foot & Ankle Surgery      ____________________________________________________________________    HPI:       Se presents today for pain related to her tailor's bunion.  The pain started 3 days ago.  Aching and throbbing rated a 6-7 out  "of 10 on the pain scale  Pain comes and goes  Is applied ice and taken Advil.  He reports that the pain is significant, he is limping and unable to walk normal.  No recollection of injury.  *  Past Medical History:   Diagnosis Date     Anxiety      Lymphangioma     per pt this was the diagnosis of his skin below the axilla on the right     Major depression 1/6/2014     Thrombophlebitis of superficial veins of right lower extremity 8/14/2017    Greater saphenous vein.   *  *No past surgical history on file.*  *  Current Outpatient Medications   Medication Sig Dispense Refill     hydrocortisone 2.5 % cream MIX 1:1 WITH 2% Ketoconazole CREAM AND APPLY TO AFFECTED AREAS TWICE DAILY FOR UP TO 14 DAYS 30 g 3     ketoconazole (NIZORAL) 2 % external cream MIX 1:1 WITH 2.5 % HYDROCORTISONE CREAM AND APPLY TO AFFECTED AREAS TWICE DAILY FOR UP TO 14 DAYS 30 g 3     Roflumilast, Antiseborrheic, 0.3 % FOAM Externally apply 1 Application topically daily Apply once daily (Patient not taking: Reported on 8/27/2024) 60 g 3         EXAM:    Vitals: /64   Ht 1.803 m (5' 11\")   BMI 45.09 kg/m    BMI: Body mass index is 45.09 kg/m .    Constitutional:  Se Robins is in no apparent distress, appears well-nourished.  Cooperative with history and physical exam.    Vascular:  Pedal pulses are palpable for both the DP and PT arteries.  CFT < 3 sec.       Neuro: Light touch sensation is intact to the L4, L5, S1 distributions  No evidence of weakness, spasticity, or contracture in the lower extremities.     Derm: Normal texture and turgor.  No erythema, ecchymosis, or cyanosis.  No open lesions.   Generalized, thick hyperkeratotic skin plantar left foot.  There is an area that is thicker on the plantar lateral aspect of the left fifth metatarsal head.  Very painful on palpation and possibly fluctuant.  No obvious edema in addition to his generalized edema.  No erythema      Musculoskeletal:    Lower extremity muscle strength is " normal. No gross deformities.  Very wide forefoot.  Ankle equinus on the left achieving near neutral position with dorsiflexion.    X-Ray Findings:  I personally reviewed the left foot images.  See comments above    XR FOOT LEFT G/E 3 VIEWS 8/27/2024 3:22 PM      HISTORY: Pain around the left fifth metatarsal head; Left foot pain     COMPARISON: None.                                                                       IMPRESSION:  No fracture or malalignment. Joint spaces are maintained. Soft tissue  swelling over the lateral forefoot.     Jason Salmon MD       Again, thank you for allowing me to participate in the care of your patient.        Sincerely,        Denzel Sellers DPM

## 2024-08-27 NOTE — PROGRESS NOTES
ASSESSMENT:  Encounter Diagnoses   Name Primary?    Left foot pain Yes    Abscess of bursa of left foot      MEDICAL DECISION MAKING:  I personally reviewed the left foot x-ray images.  Soft tissue swelling or thickening appreciated around the left fifth metatarsal head region.  No underlying osseous abnormalities.    Certainly this presents in the location of the tailor's bunion yet his pain is somewhat out of proportion for mechanical pain.  I question an associated adventitious bursa or even abscess.  There is no erythema in this region.  If he has an abscess, there is risk of progressive infection.  He is to inform us if there is any drainage at any time.    I offered an attempt at incision and drainage and he understandably reluctant, due to the pain.    Recommendations:  Short cam walker for pain relief  Present to the emergency department if worsening  Stat MRI ordered to rule out a subcutaneous abscess of the left foot  If abscess, will direct to the emergency department for IV antibiotics and incision and drainage.  White blood cell pending  Rest, elevation ice    Follow-up for recheck in 1 to 2 weeks.    Disclaimer: This note consists of symbols derived from keyboarding, dictation and/or voice recognition software. As a result, there may be errors in the script that have gone undetected. Please consider this when interpreting information found in this chart.    Denzel Sellers DPM, FACFAS, MS    [facility] Department of Podiatry/Foot & Ankle Surgery      ____________________________________________________________________    HPI:       Se presents today for pain related to her tailor's bunion.  The pain started 3 days ago.  Aching and throbbing rated a 6-7 out of 10 on the pain scale  Pain comes and goes  Is applied ice and taken Advil.  He reports that the pain is significant, he is limping and unable to walk normal.  No recollection of injury.  *  Past Medical History:   Diagnosis Date    Anxiety      "Lymphangioma     per pt this was the diagnosis of his skin below the axilla on the right    Major depression 1/6/2014    Thrombophlebitis of superficial veins of right lower extremity 8/14/2017    Greater saphenous vein.   *  *No past surgical history on file.*  *  Current Outpatient Medications   Medication Sig Dispense Refill    hydrocortisone 2.5 % cream MIX 1:1 WITH 2% Ketoconazole CREAM AND APPLY TO AFFECTED AREAS TWICE DAILY FOR UP TO 14 DAYS 30 g 3    ketoconazole (NIZORAL) 2 % external cream MIX 1:1 WITH 2.5 % HYDROCORTISONE CREAM AND APPLY TO AFFECTED AREAS TWICE DAILY FOR UP TO 14 DAYS 30 g 3    Roflumilast, Antiseborrheic, 0.3 % FOAM Externally apply 1 Application topically daily Apply once daily (Patient not taking: Reported on 8/27/2024) 60 g 3         EXAM:    Vitals: /64   Ht 1.803 m (5' 11\")   BMI 45.09 kg/m    BMI: Body mass index is 45.09 kg/m .    Constitutional:  Se Robins is in no apparent distress, appears well-nourished.  Cooperative with history and physical exam.    Vascular:  Pedal pulses are palpable for both the DP and PT arteries.  CFT < 3 sec.       Neuro: Light touch sensation is intact to the L4, L5, S1 distributions  No evidence of weakness, spasticity, or contracture in the lower extremities.     Derm: Normal texture and turgor.  No erythema, ecchymosis, or cyanosis.  No open lesions.   Generalized, thick hyperkeratotic skin plantar left foot.  There is an area that is thicker on the plantar lateral aspect of the left fifth metatarsal head.  Very painful on palpation and possibly fluctuant.  No obvious edema in addition to his generalized edema.  No erythema      Musculoskeletal:    Lower extremity muscle strength is normal. No gross deformities.  Very wide forefoot.  Ankle equinus on the left achieving near neutral position with dorsiflexion.    X-Ray Findings:  I personally reviewed the left foot images.  See comments above    XR FOOT LEFT G/E 3 VIEWS 8/27/2024 3:22 " PM      HISTORY: Pain around the left fifth metatarsal head; Left foot pain     COMPARISON: None.                                                                       IMPRESSION:  No fracture or malalignment. Joint spaces are maintained. Soft tissue  swelling over the lateral forefoot.     Jason Salmon MD

## 2024-08-28 ENCOUNTER — HOSPITAL ENCOUNTER (OUTPATIENT)
Dept: MRI IMAGING | Facility: CLINIC | Age: 39
Discharge: HOME | End: 2024-08-28
Attending: PODIATRIST | Admitting: PODIATRIST
Payer: COMMERCIAL

## 2024-08-28 ENCOUNTER — TELEPHONE (OUTPATIENT)
Dept: PODIATRY | Facility: CLINIC | Age: 39
End: 2024-08-28
Payer: COMMERCIAL

## 2024-08-28 DIAGNOSIS — M71.072 ABSCESS OF BURSA OF LEFT FOOT: ICD-10-CM

## 2024-08-28 DIAGNOSIS — M79.672 LEFT FOOT PAIN: ICD-10-CM

## 2024-08-28 PROCEDURE — 73720 MRI LWR EXTREMITY W/O&W/DYE: CPT | Mod: 26 | Performed by: RADIOLOGY

## 2024-08-28 PROCEDURE — 73720 MRI LWR EXTREMITY W/O&W/DYE: CPT | Mod: LT

## 2024-08-28 PROCEDURE — A9585 GADOBUTROL INJECTION: HCPCS | Performed by: PODIATRIST

## 2024-08-28 PROCEDURE — 255N000002 HC RX 255 OP 636: Performed by: PODIATRIST

## 2024-08-28 RX ORDER — GADOBUTROL 604.72 MG/ML
15 INJECTION INTRAVENOUS ONCE
Status: COMPLETED | OUTPATIENT
Start: 2024-08-28 | End: 2024-08-28

## 2024-08-28 RX ADMIN — GADOBUTROL 15 ML: 604.72 INJECTION INTRAVENOUS at 14:47

## 2024-08-28 NOTE — TELEPHONE ENCOUNTER
I will reach out to the patient via telephone and/or [employer] once I have reviewed the results.  He should keep the appointment on 9/13/2024.    Thank you.    Dr. Sellers

## 2024-08-28 NOTE — TELEPHONE ENCOUNTER
Please see note and advise on recommendations.    Rupa Vega, ELIE, LAT, ATC  Certified Athletic Trainer

## 2024-08-28 NOTE — TELEPHONE ENCOUNTER
Other: Se called he is scheduled to see Dr. Sellers on 9/13/24 but was told that he needed to see him either later this week or next week to discuss the results of the MRI that is being done today 8/28/24. Please contact patient to discuss     Could we send this information to you in [employer] or would you prefer to receive a phone call?:   Patient would prefer a phone call   Okay to leave a detailed message?: Yes at Cell number on file:    Telephone Information:   Mobile [phone]

## 2024-08-28 NOTE — TELEPHONE ENCOUNTER
Called and relayed this information to the patient. He has no preference on phone call or [employer] message.     Will keep appointment and will await Dr Sellers's recommendations.     Rupa Vega, ELIE, LAT, ATC  Certified Athletic Trainer

## 2024-08-29 ENCOUNTER — TELEPHONE (OUTPATIENT)
Dept: PODIATRY | Facility: CLINIC | Age: 39
End: 2024-08-29
Payer: COMMERCIAL

## 2024-08-29 NOTE — TELEPHONE ENCOUNTER
Other: Requesting c/b- unsure exactly what it was regarding his phone kept cutting - has some questions      Could we send this information to you in [employer] or would you prefer to receive a phone call?:   Patient would prefer a phone call   Okay to leave a detailed message?: No at Cell number on file:    Telephone Information:   Mobile [phone]

## 2024-08-30 NOTE — CONFIDENTIAL NOTE
Other: Patient is returning the call from  MARIO White. Please call him back as soon as you can.     Could we send this information to you in [employer] or would you prefer to receive a phone call?:   Patient would prefer a phone call   Okay to leave a detailed message?: Yes at Cell number on file:    Telephone Information:   Mobile [phone]

## 2024-08-30 NOTE — TELEPHONE ENCOUNTER
Returned call to patient.   He is working on getting a lab appointment. He asks if there is anything he can do to expedite the draining.  Recommended he elevate his foot and decrease activity as much as he can to allow it to heal.     He was planning on going hiking for the weekend and has changed those plans.   Recommended he continue wearing the boot when bearing weight and follow up at appointment.     He states he was not able to obtain the boot and has an appointment scheduled for 9/3/24 at [facility]. He will follow up at appointment.     Phone call to [facility] and spoke with Tracy. She states they have a supply issue and patients are having to schedule with [facility] to obtain a boot. This is for both [place] and [place] locations only.     SUMIT Giang RN

## 2024-08-30 NOTE — TELEPHONE ENCOUNTER
There isn't a consent to communicate on file.     Left voicemail asking for a return call and sent a [employer] message.     SUMIT Giang RN

## 2024-09-13 ENCOUNTER — OFFICE VISIT (OUTPATIENT)
Dept: PODIATRY | Facility: CLINIC | Age: 39
End: 2024-09-13
Payer: COMMERCIAL

## 2024-09-13 VITALS — WEIGHT: 315 LBS | BODY MASS INDEX: 45.05 KG/M2 | DIASTOLIC BLOOD PRESSURE: 80 MMHG | SYSTOLIC BLOOD PRESSURE: 127 MMHG

## 2024-09-13 DIAGNOSIS — M79.89 SWELLING OF LEFT FOOT: ICD-10-CM

## 2024-09-13 DIAGNOSIS — M79.672 LEFT FOOT PAIN: Primary | ICD-10-CM

## 2024-09-13 DIAGNOSIS — L85.3 DRY SKIN: ICD-10-CM

## 2024-09-13 DIAGNOSIS — R23.4 FISSURE IN SKIN OF BOTH FEET: ICD-10-CM

## 2024-09-13 DIAGNOSIS — L03.116 CELLULITIS OF LEFT FOOT: ICD-10-CM

## 2024-09-13 PROCEDURE — 99214 OFFICE O/P EST MOD 30 MIN: CPT | Performed by: PODIATRIST

## 2024-09-13 RX ORDER — AMMONIUM LACTATE 12 G/100G
CREAM TOPICAL 2 TIMES DAILY
Qty: 385 G | Refills: 3 | Status: SHIPPED | OUTPATIENT
Start: 2024-09-13

## 2024-09-13 NOTE — PROGRESS NOTES
ASSESSMENT:  Encounter Diagnoses   Name Primary?    Left foot pain Yes    Swelling of left foot     Cellulitis of left foot     Dry skin of feet     Fissure in skin of both feet      MEDICAL DECISION MAKING:  I reviewed the MRI results with Se and his wife.  I explained that the superficial fluid collection did not present as abscess.  Bursitis is considered, as well as abscess.  No systemic signs or symptoms.    I encourage he follow-up and have labs done: WBC, ESR, CRP    I also explained that if the problem persist, I can offer same-day surgery for exploration, incision and drainage, patient of any bursa.    We could also consider a fine-needle aspiration through interventional radiology.    He will continue with the cam walker as needed.  He is to monitor for any increasing redness and swelling.  Should the problem worsen, he is to present to the emergency department.    The number for scheduling surgery was provided.    His wife asked about his dry fissuring the skin.  I prescribed AmLactin 12% cream  The problem does not respond, referral to dermatology is reasonable.    Disclaimer: This note consists of symbols derived from keyboarding, dictation and/or voice recognition software. As a result, there may be errors in the script that have gone undetected. Please consider this when interpreting information found in this chart.    Denzel Sellers DPM, FACFAS, MS    [place] Department of Podiatry/Foot & Ankle Surgery      ____________________________________________________________________    HPI:       Se follows up for left foot pain.  He is accompanied by his wife.  I last evaluated him 8/27/2024.  His degree of pain with swelling and redness was concerning for an infection, possible abscess of the left foot near the fifth metatarsal head.  Bursitis was also considered.  X-rays were unremarkable other than showing swelling.  An MRI was completed and showed superficial subcutaneous fluid not consistent with  abscess.  Se was provided a short cam walker.  Your white blood cell count was ordered and not completed.    He reports that since his visit, the problem largely resolved.  He thinks the area drained, due to moisture on his sock.  A recent trip to [place] with increased walking and a concert at [employer] and now pain and swelling is returning.    He denies nausea, vomiting, fever, chills, change in appetite.  *  Past Medical History:   Diagnosis Date    Anxiety     Lymphangioma     per pt this was the diagnosis of his skin below the axilla on the right    Major depression 1/6/2014    Thrombophlebitis of superficial veins of right lower extremity 8/14/2017    Greater saphenous vein.   *  *No past surgical history on file.*  *  Current Outpatient Medications   Medication Sig Dispense Refill    hydrocortisone 2.5 % cream MIX 1:1 WITH 2% Ketoconazole CREAM AND APPLY TO AFFECTED AREAS TWICE DAILY FOR UP TO 14 DAYS 30 g 3    ketoconazole (NIZORAL) 2 % external cream MIX 1:1 WITH 2.5 % HYDROCORTISONE CREAM AND APPLY TO AFFECTED AREAS TWICE DAILY FOR UP TO 14 DAYS 30 g 3    Roflumilast, Antiseborrheic, 0.3 % FOAM Externally apply 1 Application topically daily Apply once daily (Patient not taking: Reported on 8/27/2024) 60 g 3         EXAM:    Vitals: /80   Wt 146.5 kg (323 lb)   BMI 45.05 kg/m    BMI: Body mass index is 45.05 kg/m .    Vascular:  Pedal pulses are palpable for both the DP and PT arteries.  CFT < 3 sec.        Neuro: Light touch sensation is intact to the L4, L5, S1 distributions  No evidence of weakness, spasticity, or contracture in the lower extremities.      Derm:  There is swelling over the lateral aspect of the left fifth metatarsal head with localized erythema.  Painful to the touch.  No drainage.    Diffuse, hyperkeratotic, fissured skin plantar left foot.    Musculoskeletal:    Lower extremity muscle strength is normal. No gross deformities.  Very wide forefoot.  Ankle equinus on the left  achieving near neutral position with dorsiflexion.     8/28/2024 MRI of the left foot:  Impression: In the area of concern, 2.8 x 0.9 x 2.2 cm superficial  subcutaneous fluid collection plantar lateral aspect of fifth digit at  the level of mid/distal metatarsal. This is nonspecific but given lack  of thick rim enhancement, finding is not consistent with abscess.  However, given apparent tiny overlying skin defect focus and regional  hyperemia, primary imaging differential is early abscess formation.  This also seems atypical location for adventitial bursal formation.     I have personally reviewed the examination and initial interpretation  and I agree with the findings.     YOGESH COWART           XR FOOT LEFT G/E 3 VIEWS 8/27/2024 3:22 PM      HISTORY: Pain around the left fifth metatarsal head; Left foot pain     COMPARISON: None.                                                                       IMPRESSION:  No fracture or malalignment. Joint spaces are maintained. Soft tissue  swelling over the lateral forefoot.     Jason Salmon MD

## 2024-09-13 NOTE — PATIENT INSTRUCTIONS
Thank you for choosing [facility] / [facility] Ankle Surgery!    DR. BALDERRAMA'S CLINIC LOCATIONS:     [facility] TRIAGE LINE: [phone]   [address] APPOINTMENTS: [phone]   [place] [address] RADIOLOGY: [phone]   (Every other Tues - Wed - Fri PM) SET UP SURGERY: [phone]    PHYSICAL THERAPY: [phone]   [place] SPECIALTY BILLING QUESTIONS: [phone]   [address] FAX: [phone]   [place] [address]    (Thurs & Fri AM)

## 2024-09-13 NOTE — LETTER
9/13/2024      Se Robins  [address]  [facility] [address]      Dear Colleague,    Thank you for referring your patient, Se Robins, to the [facility]. Please see a copy of my visit note below.    ASSESSMENT:  Encounter Diagnoses   Name Primary?     Left foot pain Yes     Swelling of left foot      Cellulitis of left foot      Dry skin of feet      Fissure in skin of both feet      MEDICAL DECISION MAKING:  I reviewed the MRI results with Se and his wife.  I explained that the superficial fluid collection did not present as abscess.  Bursitis is considered, as well as abscess.  No systemic signs or symptoms.    I encourage he follow-up and have labs done: WBC, ESR, CRP    I also explained that if the problem persist, I can offer same-day surgery for exploration, incision and drainage, patient of any bursa.    We could also consider a fine-needle aspiration through interventional radiology.    He will continue with the cam walker as needed.  He is to monitor for any increasing redness and swelling.  Should the problem worsen, he is to present to the emergency department.    The number for scheduling surgery was provided.    His wife asked about his dry fissuring the skin.  I prescribed AmLactin 12% cream  The problem does not respond, referral to dermatology is reasonable.    Disclaimer: This note consists of symbols derived from keyboarding, dictation and/or voice recognition software. As a result, there may be errors in the script that have gone undetected. Please consider this when interpreting information found in this chart.    Denzel Sellers, LOCO, FACFAS, [facility] Department of Podiatry/Foot & Ankle Surgery      ____________________________________________________________________    HPI:       Se follows up for left foot pain.  He is accompanied by his wife.  I last evaluated him 8/27/2024.  His degree of pain with swelling and redness was concerning  for an infection, possible abscess of the left foot near the fifth metatarsal head.  Bursitis was also considered.  X-rays were unremarkable other than showing swelling.  An MRI was completed and showed superficial subcutaneous fluid not consistent with abscess.  Se was provided a short cam walker.  Your white blood cell count was ordered and not completed.    He reports that since his visit, the problem largely resolved.  He thinks the area drained, due to moisture on his sock.  A recent trip to [place] with increased walking and a concert at [employer] and now pain and swelling is returning.    He denies nausea, vomiting, fever, chills, change in appetite.  *  Past Medical History:   Diagnosis Date     Anxiety      Lymphangioma     per pt this was the diagnosis of his skin below the axilla on the right     Major depression 1/6/2014     Thrombophlebitis of superficial veins of right lower extremity 8/14/2017    Greater saphenous vein.   *  *No past surgical history on file.*  *  Current Outpatient Medications   Medication Sig Dispense Refill     hydrocortisone 2.5 % cream MIX 1:1 WITH 2% Ketoconazole CREAM AND APPLY TO AFFECTED AREAS TWICE DAILY FOR UP TO 14 DAYS 30 g 3     ketoconazole (NIZORAL) 2 % external cream MIX 1:1 WITH 2.5 % HYDROCORTISONE CREAM AND APPLY TO AFFECTED AREAS TWICE DAILY FOR UP TO 14 DAYS 30 g 3     Roflumilast, Antiseborrheic, 0.3 % FOAM Externally apply 1 Application topically daily Apply once daily (Patient not taking: Reported on 8/27/2024) 60 g 3         EXAM:    Vitals: /80   Wt 146.5 kg (323 lb)   BMI 45.05 kg/m    BMI: Body mass index is 45.05 kg/m .    Vascular:  Pedal pulses are palpable for both the DP and PT arteries.  CFT < 3 sec.        Neuro: Light touch sensation is intact to the L4, L5, S1 distributions  No evidence of weakness, spasticity, or contracture in the lower extremities.      Derm:  There is swelling over the lateral aspect of the left fifth metatarsal  head with localized erythema.  Painful to the touch.  No drainage.    Diffuse, hyperkeratotic, fissured skin plantar left foot.    Musculoskeletal:    Lower extremity muscle strength is normal. No gross deformities.  Very wide forefoot.  Ankle equinus on the left achieving near neutral position with dorsiflexion.     8/28/2024 MRI of the left foot:  Impression: In the area of concern, 2.8 x 0.9 x 2.2 cm superficial  subcutaneous fluid collection plantar lateral aspect of fifth digit at  the level of mid/distal metatarsal. This is nonspecific but given lack  of thick rim enhancement, finding is not consistent with abscess.  However, given apparent tiny overlying skin defect focus and regional  hyperemia, primary imaging differential is early abscess formation.  This also seems atypical location for adventitial bursal formation.     I have personally reviewed the examination and initial interpretation  and I agree with the findings.     YOGESH COWART           XR FOOT LEFT G/E 3 VIEWS 8/27/2024 3:22 PM      HISTORY: Pain around the left fifth metatarsal head; Left foot pain     COMPARISON: None.                                                                       IMPRESSION:  No fracture or malalignment. Joint spaces are maintained. Soft tissue  swelling over the lateral forefoot.     Jason Salmon MD         Again, thank you for allowing me to participate in the care of your patient.        Sincerely,        Denzel Sellers DPM

## 2024-09-26 NOTE — PROGRESS NOTES
[facility] Dermatology Note  Encounter Date: Sep 27, 2024  Office Visit     Dermatology Problem List:  1. Seborrheic dermatitis   - Current tx: hydrocortisone 2.5% cream, ketoconazole cream 2%, ketoconazole 2% shampoo  2. Cyst, R lateral upper cheek   - derm surg referral   3. Lymphangioma circumscriptum.  ____________________________________________    Assessment & Plan:    # Seborrheic dermatitis.   - Continue ketoconazole 2% cream BID prn  - Continue hydrocortisone 2.5% cream BID prn, can use when not responding to ketoconazole cream   - Start  ketoconazole 2% shampoo 3x weekly    # Cyst, R lateral upper cheek  Cyst management options reviewed.  - derm surg referral     # Lymphangioma circumscriptum.  - noted    Procedures Performed:   N/A    Follow-up: 6 month(s) in-person, or earlier for new or changing lesions. Ok to cancel if doing well.    Staff and Scribe:     Scribe Disclosure:   I, Leidy Kim, am serving as a scribe; to document services personally performed by Nori Ramirez MD -based on data collection and the provider's statements to me.     Provider Disclosure:   The documentation recorded by the scribe accurately reflects the services I personally performed and the decisions made by me.    Nori Ramirez MD    Department of Dermatology  [facility]: Phone: [phone], Fax: [phone]  10/3/2024     ____________________________________________    CC: Derm Problem (Here today for a rash on face. Spot of concern on chest. )    HPI:  Mr. Se Robins is a(n) 39 year old male who presents today as a new patient for above.    The patient notes that he has a spot on his chest that has been there for a bit less than a year.   He notes that he has a spot on his chest and he has had it since he was young (approx. [age] years old) and they previously did a bx on it.  Rash on face  better.    Patient is otherwise feeling well, without additional skin concerns.    Labs Reviewed:  N/A    Physical Exam:  Vitals: There were no vitals taken for this visit.  SKIN: Focused examination of face, head, and chest was performed.  - R upper lateral cheek, white subcutaneous papulonodule  - Vascular purple papule, L upper chest   - Dermatomal somewhat vesicular and vascular plaque on R chest   - No other lesions of concern on areas examined.     Medications:  Current Outpatient Medications   Medication Sig Dispense Refill    ammonium lactate (AMLACTIN) 12 % external cream Apply topically 2 times daily. 385 g 3    hydrocortisone 2.5 % cream MIX 1:1 WITH 2% Ketoconazole CREAM AND APPLY TO AFFECTED AREAS TWICE DAILY FOR UP TO 14 DAYS 30 g 3    ketoconazole (NIZORAL) 2 % external cream MIX 1:1 WITH 2.5 % HYDROCORTISONE CREAM AND APPLY TO AFFECTED AREAS TWICE DAILY FOR UP TO 14 DAYS 30 g 3    Roflumilast, Antiseborrheic, 0.3 % FOAM Externally apply 1 Application topically daily Apply once daily 60 g 3     No current facility-administered medications for this visit.      Past Medical History:   Patient Active Problem List   Diagnosis    Varicose veins of right leg with edema    Umbilical hernia without obstruction and without gangrene    Injury of left shoulder    Class 3 obesity (H)     Past Medical History:   Diagnosis Date    Anxiety     Lymphangioma     per pt this was the diagnosis of his skin below the axilla on the right    Major depression 1/6/2014    Thrombophlebitis of superficial veins of right lower extremity 8/14/2017    Greater saphenous vein.        CC Phani Cheung MD  [address] [place] [address] on close of this encounter.

## 2024-09-27 ENCOUNTER — OFFICE VISIT (OUTPATIENT)
Dept: DERMATOLOGY | Facility: CLINIC | Age: 39
End: 2024-09-27
Payer: COMMERCIAL

## 2024-09-27 DIAGNOSIS — L72.9 CYST OF SKIN: Primary | ICD-10-CM

## 2024-09-27 DIAGNOSIS — L21.9 SEBORRHEIC DERMATITIS: ICD-10-CM

## 2024-09-27 DIAGNOSIS — D18.1 LYMPHANGIOMA CIRCUMSCRIPTUM: ICD-10-CM

## 2024-09-27 PROCEDURE — 99203 OFFICE O/P NEW LOW 30 MIN: CPT | Performed by: DERMATOLOGY

## 2024-09-27 RX ORDER — KETOCONAZOLE 20 MG/ML
SHAMPOO TOPICAL
Qty: 120 ML | Refills: 11 | Status: SHIPPED | OUTPATIENT
Start: 2024-09-27

## 2024-09-27 ASSESSMENT — PAIN SCALES - GENERAL: PAINLEVEL: NO PAIN (0)

## 2024-09-27 NOTE — PATIENT INSTRUCTIONS
Continue ketoconazole cream up to twice daily  When flaring despite the ketoconazole, can use the hydrocortisone cream twice daily as needed    I will also send ketoconazole shampoo    Dandruff Shampoos    What do I use dandruff shampoos for?  Flaking  Redness  Itching    How and when do I use these shampoos?  Rub gently into scalp.  Leave on for at least five minutes before rinsing.  You will feel better with daily use.  As redness, flaking, and itching get better you can use the shampoo less  Using two shampoos with different active elements may work best. Switch shampoos each week.    What shampoos can I buy?  Below is a list of active elements that help with itching, redness, and flaking. Name brand shampoos having these elements are included.  Non-brand shampoos that have these active elements can also be used.   Selenium Sulfide  Blue Head and Shoulders (1% selenium sulfide)  Selsun Blue (1% selenium sulfide)  Selsun Gold (2.5% selenium sulfide, prescription needed)  Salicylic Acid  Neutrogena T/Sal  (3% salicylic acid)  Sebulex  (2% sulfur, 2% salicylic acid)  Ionil  (2% salicylic acid)  Pyrithione Zinc  White Head and Shoulders (1% pyrithione zinc)  DHS Zinc Shampoo (1% pyrithione zinc)  Ketoconazole  Nizoral  (1% ketoconazole)  Tar (Note: tar may turn white/gray hair yellow if used often)  Neutrogena T/Gel (0.5% coal tar)  Neutrogena T/Gel extra strength (1% coal tar)  DHS Tar Shampoo (0.5% coal tar)

## 2024-09-27 NOTE — NURSING NOTE
Dermatology Rooming Note    Se Robins's goals for this visit include:   Chief Complaint   Patient presents with    Derm Problem     Here today for a rash on face. Spot of concern on chest.      Pia Silvestre RN

## 2024-09-27 NOTE — LETTER
9/27/2024       RE: Se Robins  [address]  [facility] [address]     Dear Colleague,    Thank you for referring your patient, Se Robins, to the [facility] [place] [facility]. Please see a copy of my visit note below.    [facility] Dermatology Note  Encounter Date: Sep 27, 2024  Office Visit     Dermatology Problem List:  1. Seborrheic dermatitis   - Current tx: hydrocortisone 2.5% cream, ketoconazole cream 2%, ketoconazole 2% shampoo  2. Cyst, R lateral upper cheek   - derm surg referral   3. Lymphangioma circumscriptum.  ____________________________________________    Assessment & Plan:    # Seborrheic dermatitis.   - Continue ketoconazole 2% cream BID prn  - Continue hydrocortisone 2.5% cream BID prn, can use when not responding to ketoconazole cream   - Start  ketoconazole 2% shampoo 3x weekly    # Cyst, R lateral upper cheek  Cyst management options reviewed.  - derm surg referral     # Lymphangioma circumscriptum.  - noted    Procedures Performed:   N/A    Follow-up: 6 month(s) in-person, or earlier for new or changing lesions. Ok to cancel if doing well.    Staff and Scribe:     Scribe Disclosure:   I, Leidy Kim, am serving as a scribe; to document services personally performed by Nori Ramirez MD -based on data collection and the provider's statements to me.     Provider Disclosure:   The documentation recorded by the scribe accurately reflects the services I personally performed and the decisions made by me.    Nori Ramirez MD    Department of Dermatology  [facility]: Phone: [phone], Fax: [phone]  10/3/2024     ____________________________________________    CC: Derm Problem (Here today for a rash on face. Spot of concern on chest. )    HPI:  Mr. Se SMITH Shimon is a(n) 39 year  old male who presents today as a new patient for above.    The patient notes that he has a spot on his chest that has been there for a bit less than a year.   He notes that he has a spot on his chest and he has had it since he was young (approx. 13/12 years old) and they previously did a bx on it.  Rash on face better.    Patient is otherwise feeling well, without additional skin concerns.    Labs Reviewed:  N/A    Physical Exam:  Vitals: There were no vitals taken for this visit.  SKIN: Focused examination of face, head, and chest was performed.  - R upper lateral cheek, white subcutaneous papulonodule  - Vascular purple papule, L upper chest   - Dermatomal somewhat vesicular and vascular plaque on R chest   - No other lesions of concern on areas examined.     Medications:  Current Outpatient Medications   Medication Sig Dispense Refill     ammonium lactate (AMLACTIN) 12 % external cream Apply topically 2 times daily. 385 g 3     hydrocortisone 2.5 % cream MIX 1:1 WITH 2% Ketoconazole CREAM AND APPLY TO AFFECTED AREAS TWICE DAILY FOR UP TO 14 DAYS 30 g 3     ketoconazole (NIZORAL) 2 % external cream MIX 1:1 WITH 2.5 % HYDROCORTISONE CREAM AND APPLY TO AFFECTED AREAS TWICE DAILY FOR UP TO 14 DAYS 30 g 3     Roflumilast, Antiseborrheic, 0.3 % FOAM Externally apply 1 Application topically daily Apply once daily 60 g 3     No current facility-administered medications for this visit.      Past Medical History:   Patient Active Problem List   Diagnosis     Varicose veins of right leg with edema     Umbilical hernia without obstruction and without gangrene     Injury of left shoulder     Class 3 obesity (H)     Past Medical History:   Diagnosis Date     Anxiety      Lymphangioma     per pt this was the diagnosis of his skin below the axilla on the right     Major depression 1/6/2014     Thrombophlebitis of superficial veins of right lower extremity 8/14/2017    Greater saphenous vein.        CC Phani Cheung,  MD  [address] [place] [address] on close of this encounter.      Again, thank you for allowing me to participate in the care of your patient.      Sincerely,    Nori Ramirez MD

## 2024-09-30 ENCOUNTER — TELEPHONE (OUTPATIENT)
Dept: DERMATOLOGY | Facility: CLINIC | Age: 39
End: 2024-09-30
Payer: COMMERCIAL

## 2024-09-30 NOTE — TELEPHONE ENCOUNTER
Left patient a voicemail to schedule an excision for cyst of left cheek with Dr. Morel. Provided my direct phone number.    Mirna Haro on 9/30/2024 at 4:26 PM

## 2024-11-22 ENCOUNTER — OFFICE VISIT (OUTPATIENT)
Dept: PEDIATRICS | Facility: CLINIC | Age: 39
End: 2024-11-22
Payer: COMMERCIAL

## 2024-11-22 ENCOUNTER — ANCILLARY PROCEDURE (OUTPATIENT)
Dept: ULTRASOUND IMAGING | Facility: CLINIC | Age: 39
End: 2024-11-22
Attending: NURSE PRACTITIONER
Payer: COMMERCIAL

## 2024-11-22 VITALS
SYSTOLIC BLOOD PRESSURE: 141 MMHG | RESPIRATION RATE: 20 BRPM | OXYGEN SATURATION: 100 % | DIASTOLIC BLOOD PRESSURE: 89 MMHG | HEART RATE: 84 BPM | TEMPERATURE: 98.5 F

## 2024-11-22 DIAGNOSIS — R60.0 EDEMA OF RIGHT LOWER EXTREMITY: ICD-10-CM

## 2024-11-22 DIAGNOSIS — L53.9 LEG ERYTHEMA: ICD-10-CM

## 2024-11-22 DIAGNOSIS — I82.811 SUPERFICIAL THROMBOSIS OF LEG, RIGHT: ICD-10-CM

## 2024-11-22 DIAGNOSIS — L03.115 CELLULITIS OF LEG, RIGHT: Primary | ICD-10-CM

## 2024-11-22 LAB
ANION GAP SERPL CALCULATED.3IONS-SCNC: 10 MMOL/L (ref 7–15)
BASOPHILS # BLD AUTO: 0 10E3/UL (ref 0–0.2)
BASOPHILS NFR BLD AUTO: 0 %
BUN SERPL-MCNC: 10.9 MG/DL (ref 6–20)
CALCIUM SERPL-MCNC: 9.3 MG/DL (ref 8.8–10.4)
CHLORIDE SERPL-SCNC: 98 MMOL/L (ref 98–107)
CREAT SERPL-MCNC: 0.7 MG/DL (ref 0.67–1.17)
CRP SERPL-MCNC: 71.51 MG/L
EGFRCR SERPLBLD CKD-EPI 2021: >90 ML/MIN/1.73M2
EOSINOPHIL # BLD AUTO: 0.2 10E3/UL (ref 0–0.7)
EOSINOPHIL NFR BLD AUTO: 3 %
ERYTHROCYTE [DISTWIDTH] IN BLOOD BY AUTOMATED COUNT: 13.2 % (ref 10–15)
ERYTHROCYTE [SEDIMENTATION RATE] IN BLOOD BY WESTERGREN METHOD: 39 MM/HR (ref 0–15)
GLUCOSE SERPL-MCNC: 109 MG/DL (ref 70–99)
HCO3 SERPL-SCNC: 28 MMOL/L (ref 22–29)
HCT VFR BLD AUTO: 41.9 % (ref 40–53)
HGB BLD-MCNC: 13.7 G/DL (ref 13.3–17.7)
IMM GRANULOCYTES # BLD: 0 10E3/UL
IMM GRANULOCYTES NFR BLD: 0 %
LYMPHOCYTES # BLD AUTO: 2.7 10E3/UL (ref 0.8–5.3)
LYMPHOCYTES NFR BLD AUTO: 29 %
MCH RBC QN AUTO: 28.7 PG (ref 26.5–33)
MCHC RBC AUTO-ENTMCNC: 32.7 G/DL (ref 31.5–36.5)
MCV RBC AUTO: 88 FL (ref 78–100)
MONOCYTES # BLD AUTO: 1.2 10E3/UL (ref 0–1.3)
MONOCYTES NFR BLD AUTO: 13 %
NEUTROPHILS # BLD AUTO: 5.2 10E3/UL (ref 1.6–8.3)
NEUTROPHILS NFR BLD AUTO: 55 %
PLATELET # BLD AUTO: 289 10E3/UL (ref 150–450)
POTASSIUM SERPL-SCNC: 3.9 MMOL/L (ref 3.4–5.3)
RBC # BLD AUTO: 4.78 10E6/UL (ref 4.4–5.9)
SODIUM SERPL-SCNC: 136 MMOL/L (ref 135–145)
WBC # BLD AUTO: 9.4 10E3/UL (ref 4–11)

## 2024-11-22 PROCEDURE — 80048 BASIC METABOLIC PNL TOTAL CA: CPT | Performed by: NURSE PRACTITIONER

## 2024-11-22 PROCEDURE — 99214 OFFICE O/P EST MOD 30 MIN: CPT | Mod: 25 | Performed by: NURSE PRACTITIONER

## 2024-11-22 PROCEDURE — 86140 C-REACTIVE PROTEIN: CPT | Performed by: NURSE PRACTITIONER

## 2024-11-22 PROCEDURE — 36415 COLL VENOUS BLD VENIPUNCTURE: CPT | Performed by: NURSE PRACTITIONER

## 2024-11-22 PROCEDURE — 85652 RBC SED RATE AUTOMATED: CPT | Performed by: NURSE PRACTITIONER

## 2024-11-22 PROCEDURE — 85025 COMPLETE CBC W/AUTO DIFF WBC: CPT | Performed by: NURSE PRACTITIONER

## 2024-11-22 PROCEDURE — 96372 THER/PROPH/DIAG INJ SC/IM: CPT | Performed by: NURSE PRACTITIONER

## 2024-11-22 PROCEDURE — 93971 EXTREMITY STUDY: CPT | Mod: RT

## 2024-11-22 RX ORDER — CEFTRIAXONE SODIUM 1 G
1 VIAL (EA) INJECTION ONCE
Status: COMPLETED | OUTPATIENT
Start: 2024-11-22 | End: 2024-11-22

## 2024-11-22 RX ADMIN — Medication 1 G: at 15:23

## 2024-11-22 NOTE — PATIENT INSTRUCTIONS
Take medications as prescribed until gone.    Keep legs elevated whenever possible.    If he develops a new fever over 100.4 or the redness worsens go to the Emergency Room.

## 2024-11-22 NOTE — PROGRESS NOTES
Chief Complaint   Patient presents with    Musculoskeletal Problem         ICD-10-CM    1. Cellulitis of leg, right  L03.115 cefTRIAXone (ROCEPHIN) in lidocaine 1% (PF) for IM administration 1 g     amoxicillin-clavulanate (AUGMENTIN) 875-125 MG tablet      2. Edema of right lower extremity  R60.0 CBC with platelets differential     Basic metabolic panel     CRP inflammation     Erythrocyte sedimentation rate auto     US Lower Extremity Venous Duplex Right     CBC with platelets differential     Basic metabolic panel     CRP inflammation     Erythrocyte sedimentation rate auto     cefTRIAXone (ROCEPHIN) in lidocaine 1% (PF) for IM administration 1 g      3. Leg erythema  L53.9 CBC with platelets differential     Basic metabolic panel     CRP inflammation     Erythrocyte sedimentation rate auto     US Lower Extremity Venous Duplex Right     CBC with platelets differential     Basic metabolic panel     CRP inflammation     Erythrocyte sedimentation rate auto     cefTRIAXone (ROCEPHIN) in lidocaine 1% (PF) for IM administration 1 g      4. Superficial thrombosis of leg, right  I82.811        Superficial thrombus is insignificant and does not extend into the popliteal area. Ceftriaxone injection given and after 25 minutes there was no allergic reaction.  He will start Augmentin tomorrow morning.  Instructed patient to keep leg elevated whenever possible and if the redness worsens or he develops fever he  Should go to the emergency room immediately.    Reviewed potential adverse reactions to medications.    34 minutes spent by me on the date of the encounter doing chart review, history and exam, documentation and further activities per the note    EXAM: US LOWER EXTREMITY VENOUS DUPLEX RIGHT  LOCATION: [facility]  DATE: 11/22/2024     INDICATION:  Edema of right lower extremity, Leg erythema  COMPARISON: None.  TECHNIQUE: Venous Duplex ultrasound of the right lower extremity with and without  compression, augmentation and duplex. Color flow and spectral Doppler with waveform analysis performed.     FINDINGS: Exam includes the common femoral, femoral, popliteal, and contralateral common femoral veins as well as segmentally visualized deep calf veins and greater saphenous vein.      RIGHT: No deep vein thrombosis. There is superficial thrombus in varicose veins mid thigh, and nonocclusive superficial thrombus within the greater saphenous vein of the thigh. This does not extend into the deep system. No popliteal cyst.                                                                      IMPRESSION:  1.  No deep venous thrombosis in the right lower extremity.  2.  Age-indeterminate superficial thrombus in the greater saphenous vein and varicose veins of the thigh.    Results for orders placed or performed in visit on 11/22/24 (from the past 24 hours)   CBC with platelets differential    Narrative    The following orders were created for panel order CBC with platelets differential.  Procedure                               Abnormality         Status                     ---------                               -----------         ------                     CBC with platelets and d...[[id number]]                      Final result                 Please view results for these tests on the individual orders.   Basic metabolic panel   Result Value Ref Range    Sodium 136 135 - 145 mmol/L    Potassium 3.9 3.4 - 5.3 mmol/L    Chloride 98 98 - 107 mmol/L    Carbon Dioxide (CO2) 28 22 - 29 mmol/L    Anion Gap 10 7 - 15 mmol/L    Urea Nitrogen 10.9 6.0 - 20.0 mg/dL    Creatinine 0.70 0.67 - 1.17 mg/dL    GFR Estimate >90 >60 mL/min/1.73m2    Calcium 9.3 8.8 - 10.4 mg/dL    Glucose 109 (H) 70 - 99 mg/dL   CRP inflammation   Result Value Ref Range    CRP Inflammation 71.51 (H) <5.00 mg/L   Erythrocyte sedimentation rate auto   Result Value Ref Range    Erythrocyte Sedimentation Rate 39 (H) 0 - 15 mm/hr   CBC with platelets  and differential   Result Value Ref Range    WBC Count 9.4 4.0 - 11.0 10e3/uL    RBC Count 4.78 4.40 - 5.90 10e6/uL    Hemoglobin 13.7 13.3 - 17.7 g/dL    Hematocrit 41.9 40.0 - 53.0 %    MCV 88 78 - 100 fL    MCH 28.7 26.5 - 33.0 pg    MCHC 32.7 31.5 - 36.5 g/dL    RDW 13.2 10.0 - 15.0 %    Platelet Count 289 150 - 450 10e3/uL    % Neutrophils 55 %    % Lymphocytes 29 %    % Monocytes 13 %    % Eosinophils 3 %    % Basophils 0 %    % Immature Granulocytes 0 %    Absolute Neutrophils 5.2 1.6 - 8.3 10e3/uL    Absolute Lymphocytes 2.7 0.8 - 5.3 10e3/uL    Absolute Monocytes 1.2 0.0 - 1.3 10e3/uL    Absolute Eosinophils 0.2 0.0 - 0.7 10e3/uL    Absolute Basophils 0.0 0.0 - 0.2 10e3/uL    Absolute Immature Granulocytes 0.0 <=0.4 10e3/uL     Enedelia Saez is a 39 year old, presenting for the following health issues:  Musculoskeletal Problem    HPI     Evaluation for possible DVT  Onset/Duration: 2 weeks   Description:       Location: right calf        Redness: YES       Pain: 5/10       Warmth: YES       Joint swelling YES  Progression of symptoms worse  Accompanying signs and symptoms:       Fevers: YES       Numbness/tingling/weakness: no        Chest pain/pleurisy: no        Shortness of breath: no   History        Trauma: no         Recent travel/when: no         Previous history of DVT: yes        Family history of DVT: YES        Recent surgery: no   Aggravating factors include: elevating makes it better  Therapies tried and outcome: advil  Prior surgery on arteries of veins in this area: No      ROS: 10 point ROS neg other than the symptoms noted above in the HPI.       Objective    BP (!) 141/89   Pulse 84   Temp 98.5  F (36.9  C) (Oral)   Resp 20   SpO2 100%   Nurses notes and VS have been reviewed.    Physical Exam       GENERAL APPEARANCE: alert and mild distress     HENT: oral exam benign, mucus membranes intact, without ulcers or lesions     NECK: no adenopathy or asymmetry, thyroid normal to  palpation     RESP: lungs clear to auscultation - no rales, rhonchi or wheezes     CV: regular rates and rhythm, no murmurs, rubs, or gallop     MS: extremities normal- no gross deformities noted; normal muscle tone, except for Severe edema of the right lower extremity     SKIN: Significant erythema and edema of the right lower extremity, from foot edema extends above the knee, erythema is present from the ankle to the knee     NEURO: Normal strength and tone, mentation intact and speech normal     PSYCH: normal thought process; no significant mood disturbance      MUKUND Shay, CNP  [facility] Provider    The use of [employer] dictation services may have been used to construct the content in this note; any grammatical or spelling errors are non-intentional. Please contact the author of this note directly if you are in need of any clarification.

## 2024-11-22 NOTE — NURSING NOTE
Clinic Administered Medication Documentation        Patient was given Ceftriaxone mixed with 1% Lidocane. Prior to medication administration, verified patient's identity using patient s name and date of birth. Please see MAR and medication order for additional information. Patient instructed to remain in clinic for 15 minutes and report any adverse reaction to staff immediately.    Vial/Syringe: Single dose vial. Was entire vial of medication used? Yes      Dale Gusman MA on 11/22/2024 at 3:30 PM\

## 2024-11-26 ENCOUNTER — HOSPITAL ENCOUNTER (INPATIENT)
Facility: CLINIC | Age: 39
End: 2024-11-26
Attending: STUDENT IN AN ORGANIZED HEALTH CARE EDUCATION/TRAINING PROGRAM | Admitting: HOSPITALIST
Payer: COMMERCIAL

## 2024-11-26 ENCOUNTER — OFFICE VISIT (OUTPATIENT)
Dept: URGENT CARE | Facility: URGENT CARE | Age: 39
End: 2024-11-26
Payer: COMMERCIAL

## 2024-11-26 VITALS
DIASTOLIC BLOOD PRESSURE: 89 MMHG | RESPIRATION RATE: 20 BRPM | HEART RATE: 105 BPM | SYSTOLIC BLOOD PRESSURE: 181 MMHG | TEMPERATURE: 101.1 F | BODY MASS INDEX: 52.02 KG/M2 | WEIGHT: 315 LBS | OXYGEN SATURATION: 98 %

## 2024-11-26 DIAGNOSIS — L03.115 CELLULITIS OF RIGHT LEG: Primary | ICD-10-CM

## 2024-11-26 DIAGNOSIS — L03.115 CELLULITIS OF RIGHT LEG: ICD-10-CM

## 2024-11-26 LAB
ANION GAP SERPL CALCULATED.3IONS-SCNC: 10 MMOL/L (ref 7–15)
BASOPHILS # BLD AUTO: 0.1 10E3/UL (ref 0–0.2)
BASOPHILS NFR BLD AUTO: 1 %
BUN SERPL-MCNC: 9.9 MG/DL (ref 6–20)
CALCIUM SERPL-MCNC: 9.1 MG/DL (ref 8.8–10.4)
CHLORIDE SERPL-SCNC: 96 MMOL/L (ref 98–107)
CREAT SERPL-MCNC: 0.68 MG/DL (ref 0.67–1.17)
EGFRCR SERPLBLD CKD-EPI 2021: >90 ML/MIN/1.73M2
EOSINOPHIL # BLD AUTO: 0.2 10E3/UL (ref 0–0.7)
EOSINOPHIL NFR BLD AUTO: 1 %
ERYTHROCYTE [DISTWIDTH] IN BLOOD BY AUTOMATED COUNT: 13.3 % (ref 10–15)
GLUCOSE SERPL-MCNC: 105 MG/DL (ref 70–99)
HCO3 SERPL-SCNC: 27 MMOL/L (ref 22–29)
HCT VFR BLD AUTO: 41 % (ref 40–53)
HGB BLD-MCNC: 13.7 G/DL (ref 13.3–17.7)
HOLD SPECIMEN: NORMAL
IMM GRANULOCYTES # BLD: 0.1 10E3/UL
IMM GRANULOCYTES NFR BLD: 1 %
LACTATE SERPL-SCNC: 1 MMOL/L (ref 0.7–2)
LYMPHOCYTES # BLD AUTO: 1.7 10E3/UL (ref 0.8–5.3)
LYMPHOCYTES NFR BLD AUTO: 13 %
MCH RBC QN AUTO: 29.2 PG (ref 26.5–33)
MCHC RBC AUTO-ENTMCNC: 33.4 G/DL (ref 31.5–36.5)
MCV RBC AUTO: 87 FL (ref 78–100)
MONOCYTES # BLD AUTO: 1.3 10E3/UL (ref 0–1.3)
MONOCYTES NFR BLD AUTO: 10 %
NEUTROPHILS # BLD AUTO: 10.1 10E3/UL (ref 1.6–8.3)
NEUTROPHILS NFR BLD AUTO: 75 %
NRBC # BLD AUTO: 0 10E3/UL
NRBC BLD AUTO-RTO: 0 /100
PLATELET # BLD AUTO: 347 10E3/UL (ref 150–450)
POTASSIUM SERPL-SCNC: 4.4 MMOL/L (ref 3.4–5.3)
RBC # BLD AUTO: 4.69 10E6/UL (ref 4.4–5.9)
SODIUM SERPL-SCNC: 133 MMOL/L (ref 135–145)
WBC # BLD AUTO: 13.5 10E3/UL (ref 4–11)

## 2024-11-26 PROCEDURE — 87186 SC STD MICRODIL/AGAR DIL: CPT | Performed by: STUDENT IN AN ORGANIZED HEALTH CARE EDUCATION/TRAINING PROGRAM

## 2024-11-26 PROCEDURE — 80048 BASIC METABOLIC PNL TOTAL CA: CPT | Performed by: STUDENT IN AN ORGANIZED HEALTH CARE EDUCATION/TRAINING PROGRAM

## 2024-11-26 PROCEDURE — 36415 COLL VENOUS BLD VENIPUNCTURE: CPT | Performed by: STUDENT IN AN ORGANIZED HEALTH CARE EDUCATION/TRAINING PROGRAM

## 2024-11-26 PROCEDURE — 258N000003 HC RX IP 258 OP 636: Performed by: STUDENT IN AN ORGANIZED HEALTH CARE EDUCATION/TRAINING PROGRAM

## 2024-11-26 PROCEDURE — 82310 ASSAY OF CALCIUM: CPT | Performed by: STUDENT IN AN ORGANIZED HEALTH CARE EDUCATION/TRAINING PROGRAM

## 2024-11-26 PROCEDURE — 250N000011 HC RX IP 250 OP 636: Mod: JZ | Performed by: STUDENT IN AN ORGANIZED HEALTH CARE EDUCATION/TRAINING PROGRAM

## 2024-11-26 PROCEDURE — 85025 COMPLETE CBC W/AUTO DIFF WBC: CPT | Performed by: STUDENT IN AN ORGANIZED HEALTH CARE EDUCATION/TRAINING PROGRAM

## 2024-11-26 PROCEDURE — 99223 1ST HOSP IP/OBS HIGH 75: CPT

## 2024-11-26 PROCEDURE — 87040 BLOOD CULTURE FOR BACTERIA: CPT | Performed by: STUDENT IN AN ORGANIZED HEALTH CARE EDUCATION/TRAINING PROGRAM

## 2024-11-26 PROCEDURE — 250N000013 HC RX MED GY IP 250 OP 250 PS 637: Performed by: STUDENT IN AN ORGANIZED HEALTH CARE EDUCATION/TRAINING PROGRAM

## 2024-11-26 PROCEDURE — G0378 HOSPITAL OBSERVATION PER HR: HCPCS

## 2024-11-26 PROCEDURE — 99285 EMERGENCY DEPT VISIT HI MDM: CPT | Mod: 25

## 2024-11-26 PROCEDURE — 96361 HYDRATE IV INFUSION ADD-ON: CPT

## 2024-11-26 PROCEDURE — 96365 THER/PROPH/DIAG IV INF INIT: CPT

## 2024-11-26 PROCEDURE — 83605 ASSAY OF LACTIC ACID: CPT | Performed by: STUDENT IN AN ORGANIZED HEALTH CARE EDUCATION/TRAINING PROGRAM

## 2024-11-26 PROCEDURE — 99214 OFFICE O/P EST MOD 30 MIN: CPT

## 2024-11-26 PROCEDURE — 85041 AUTOMATED RBC COUNT: CPT | Performed by: STUDENT IN AN ORGANIZED HEALTH CARE EDUCATION/TRAINING PROGRAM

## 2024-11-26 PROCEDURE — 87149 DNA/RNA DIRECT PROBE: CPT | Performed by: STUDENT IN AN ORGANIZED HEALTH CARE EDUCATION/TRAINING PROGRAM

## 2024-11-26 RX ORDER — CEFAZOLIN SODIUM 2 G/100ML
2 INJECTION, SOLUTION INTRAVENOUS ONCE
Status: COMPLETED | OUTPATIENT
Start: 2024-11-26 | End: 2024-11-26

## 2024-11-26 RX ORDER — ACETAMINOPHEN 500 MG
1000 TABLET ORAL ONCE
Status: COMPLETED | OUTPATIENT
Start: 2024-11-26 | End: 2024-11-26

## 2024-11-26 RX ADMIN — SODIUM CHLORIDE 1000 ML: 9 INJECTION, SOLUTION INTRAVENOUS at 18:58

## 2024-11-26 RX ADMIN — CEFAZOLIN SODIUM 2 G: 2 INJECTION, SOLUTION INTRAVENOUS at 18:37

## 2024-11-26 RX ADMIN — ACETAMINOPHEN 1000 MG: 500 TABLET, FILM COATED ORAL at 19:00

## 2024-11-26 ASSESSMENT — COLUMBIA-SUICIDE SEVERITY RATING SCALE - C-SSRS
6. HAVE YOU EVER DONE ANYTHING, STARTED TO DO ANYTHING, OR PREPARED TO DO ANYTHING TO END YOUR LIFE?: NO
1. IN THE PAST MONTH, HAVE YOU WISHED YOU WERE DEAD OR WISHED YOU COULD GO TO SLEEP AND NOT WAKE UP?: NO
2. HAVE YOU ACTUALLY HAD ANY THOUGHTS OF KILLING YOURSELF IN THE PAST MONTH?: NO

## 2024-11-26 ASSESSMENT — ACTIVITIES OF DAILY LIVING (ADL)
ADLS_ACUITY_SCORE: 41

## 2024-11-26 NOTE — ED TRIAGE NOTES
Right leg cellulitis. Was sent here from [place]    Patient does not want to stay if possible. Has a flight to catch tomorrow.

## 2024-11-26 NOTE — PROGRESS NOTES
Patient presents with:  Urgent Care: Was seen on 11/22 for edema of lower right extremity- was seen at [facility] to rule out DVT- states that blood work came back with infection-  had dose of ceftriaxone while here as well as abx at home- states that he has been taking abx as prescribed at home-  Has had no relief and states it may even be somewhat worse today.  Has had intermittent elevated temps      Clinical Decision Making:      ICD-10-CM    1. Cellulitis of right leg  L03.115         With patient's worsening redness, swelling, tachycardia, and fever of 101.1F today in clinic after already attempting oral antibiotics, I advised patient to go to ER for possible concern of sepsis and/or need for IV antibiotics. Discussed case with ER provider and they agree to see him.     Patient Instructions   Given that you are having worsening redness, swelling, and fevers, I recommend you be seen in the ER for further evaluation of sepsis and possible need of IV antibiotics.     HPI:  Se Robins is a 39 year old male who presents today with concerns of right leg erythema and swelling. Was seen in the [facility] on Friday (4 day ago), ruled out for DVT and treated with rocephin and Augmentin. Patient has not missed a dose of antibiotics. He comes in today because redness has spread and infection does not seem to be improving at all. He is having fevers that he states he has been having since the infection started.       History obtained from the patient.    Problem List:  2024-02: Class 3 obesity  2023-05: Injury of left shoulder  2022-12: Umbilical hernia without obstruction and without gangrene  2017-08: Thrombophlebitis of superficial veins of right lower   extremity  2017-03: Varicose veins of right leg with edema  2014-01: Anxiety  2014-01: Major depression      Past Medical History:   Diagnosis Date    Anxiety     Lymphangioma     per pt this was the diagnosis of his skin below the axilla on the right    Major depression 1/6/2014     Thrombophlebitis of superficial veins of right lower extremity 8/14/2017    Greater saphenous vein.       Social History     Tobacco Use    Smoking status: Former     Current packs/day: 1.00     Types: Cigarettes    Smokeless tobacco: Never   Substance Use Topics    Alcohol use: Yes     Comment: occasionally           Vitals:    11/26/24 1559   BP: (!) 181/89   Pulse: 105   Resp: 20   Temp: (!) 101.1  F (38.4  C)   TempSrc: Tympanic   SpO2: 98%   Weight: (!) 169.2 kg (373 lb)       Physical Exam  Constitutional:       General: He is not in acute distress.     Appearance: He is not diaphoretic.   HENT:      Head: Normocephalic and atraumatic.      Right Ear: External ear normal.      Left Ear: External ear normal.   Eyes:      Conjunctiva/sclera: Conjunctivae normal.   Cardiovascular:      Rate and Rhythm: Normal rate and regular rhythm.   Pulmonary:      Effort: Pulmonary effort is normal. No respiratory distress.   Musculoskeletal:         General: Swelling present.   Skin:     General: Skin is warm.      Capillary Refill: Capillary refill takes less than 2 seconds.      Findings: Erythema present.      Comments: Marked erythema to right lower extremity. Skin hot to the touch. Shown in image below.   Neurological:      General: No focal deficit present.      Mental Status: He is alert.   Psychiatric:         Mood and Affect: Mood normal.         Thought Content: Thought content normal.         Judgment: Judgment normal.               At the end of the encounter, I discussed results, diagnosis, medications. Discussed red flags for immediate return to clinic/ER, as well as indications for follow up if no improvement. Patient understood and agreed to plan. Patient was stable for discharge.    MUKUND Bernal [facility]

## 2024-11-26 NOTE — PATIENT INSTRUCTIONS
Given that you are having worsening redness, swelling, and fevers, I recommend you be seen in the ER for further evaluation of sepsis and possible need of IV antibiotics.    1 Principal Discharge DX:	Skin abscess

## 2024-11-27 LAB
ANION GAP SERPL CALCULATED.3IONS-SCNC: 11 MMOL/L (ref 7–15)
BUN SERPL-MCNC: 8.2 MG/DL (ref 6–20)
CALCIUM SERPL-MCNC: 8.6 MG/DL (ref 8.8–10.4)
CHLORIDE SERPL-SCNC: 97 MMOL/L (ref 98–107)
CREAT SERPL-MCNC: 0.6 MG/DL (ref 0.67–1.17)
CRP SERPL-MCNC: 135.92 MG/L
EGFRCR SERPLBLD CKD-EPI 2021: >90 ML/MIN/1.73M2
ENTEROCOCCUS FAECALIS: NOT DETECTED
ENTEROCOCCUS FAECIUM: NOT DETECTED
ERYTHROCYTE [DISTWIDTH] IN BLOOD BY AUTOMATED COUNT: 13.1 % (ref 10–15)
GLUCOSE SERPL-MCNC: 147 MG/DL (ref 70–99)
HCO3 SERPL-SCNC: 25 MMOL/L (ref 22–29)
HCT VFR BLD AUTO: 37.5 % (ref 40–53)
HGB BLD-MCNC: 12.5 G/DL (ref 13.3–17.7)
LISTERIA SPECIES (DETECTED/NOT DETECTED): NOT DETECTED
MCH RBC QN AUTO: 28.9 PG (ref 26.5–33)
MCHC RBC AUTO-ENTMCNC: 33.3 G/DL (ref 31.5–36.5)
MCV RBC AUTO: 87 FL (ref 78–100)
PLATELET # BLD AUTO: 357 10E3/UL (ref 150–450)
POTASSIUM SERPL-SCNC: 3.8 MMOL/L (ref 3.4–5.3)
RBC # BLD AUTO: 4.32 10E6/UL (ref 4.4–5.9)
SODIUM SERPL-SCNC: 133 MMOL/L (ref 135–145)
STAPHYLOCOCCUS AUREUS: NOT DETECTED
STAPHYLOCOCCUS EPIDERMIDIS: DETECTED
STAPHYLOCOCCUS LUGDUNENSIS: NOT DETECTED
STREPTOCOCCUS AGALACTIAE: NOT DETECTED
STREPTOCOCCUS ANGINOSUS GROUP: NOT DETECTED
STREPTOCOCCUS PNEUMONIAE: NOT DETECTED
STREPTOCOCCUS PYOGENES: NOT DETECTED
STREPTOCOCCUS SPECIES: NOT DETECTED
WBC # BLD AUTO: 12.2 10E3/UL (ref 4–11)

## 2024-11-27 PROCEDURE — 250N000011 HC RX IP 250 OP 636: Performed by: INTERNAL MEDICINE

## 2024-11-27 PROCEDURE — G0378 HOSPITAL OBSERVATION PER HR: HCPCS

## 2024-11-27 PROCEDURE — 36415 COLL VENOUS BLD VENIPUNCTURE: CPT | Performed by: HOSPITALIST

## 2024-11-27 PROCEDURE — 250N000011 HC RX IP 250 OP 636: Mod: JZ

## 2024-11-27 PROCEDURE — 80048 BASIC METABOLIC PNL TOTAL CA: CPT

## 2024-11-27 PROCEDURE — 80051 ELECTROLYTE PANEL: CPT

## 2024-11-27 PROCEDURE — 85027 COMPLETE CBC AUTOMATED: CPT

## 2024-11-27 PROCEDURE — 86140 C-REACTIVE PROTEIN: CPT | Performed by: HOSPITALIST

## 2024-11-27 PROCEDURE — 87040 BLOOD CULTURE FOR BACTERIA: CPT | Performed by: HOSPITALIST

## 2024-11-27 PROCEDURE — 250N000013 HC RX MED GY IP 250 OP 250 PS 637

## 2024-11-27 PROCEDURE — 120N000001 HC R&B MED SURG/OB

## 2024-11-27 PROCEDURE — 82565 ASSAY OF CREATININE: CPT

## 2024-11-27 PROCEDURE — 36415 COLL VENOUS BLD VENIPUNCTURE: CPT

## 2024-11-27 PROCEDURE — 99233 SBSQ HOSP IP/OBS HIGH 50: CPT | Performed by: HOSPITALIST

## 2024-11-27 PROCEDURE — 250N000011 HC RX IP 250 OP 636: Performed by: HOSPITALIST

## 2024-11-27 RX ORDER — ACETAMINOPHEN 650 MG/1
650 SUPPOSITORY RECTAL EVERY 4 HOURS PRN
Status: DISCONTINUED | OUTPATIENT
Start: 2024-11-27 | End: 2024-12-05 | Stop reason: HOSPADM

## 2024-11-27 RX ORDER — AMOXICILLIN 250 MG
2 CAPSULE ORAL 2 TIMES DAILY PRN
Status: DISCONTINUED | OUTPATIENT
Start: 2024-11-27 | End: 2024-12-05 | Stop reason: HOSPADM

## 2024-11-27 RX ORDER — KETOROLAC TROMETHAMINE 15 MG/ML
15 INJECTION, SOLUTION INTRAMUSCULAR; INTRAVENOUS ONCE
Status: COMPLETED | OUTPATIENT
Start: 2024-11-27 | End: 2024-11-27

## 2024-11-27 RX ORDER — AMOXICILLIN 250 MG
1 CAPSULE ORAL 2 TIMES DAILY PRN
Status: DISCONTINUED | OUTPATIENT
Start: 2024-11-27 | End: 2024-12-05 | Stop reason: HOSPADM

## 2024-11-27 RX ORDER — CLINDAMYCIN PHOSPHATE 900 MG/50ML
900 INJECTION, SOLUTION INTRAVENOUS EVERY 8 HOURS
Status: DISCONTINUED | OUTPATIENT
Start: 2024-11-27 | End: 2024-11-28

## 2024-11-27 RX ORDER — CEFAZOLIN SODIUM 2 G/100ML
2 INJECTION, SOLUTION INTRAVENOUS EVERY 8 HOURS
Status: DISCONTINUED | OUTPATIENT
Start: 2024-11-27 | End: 2024-11-27

## 2024-11-27 RX ORDER — ONDANSETRON 2 MG/ML
4 INJECTION INTRAMUSCULAR; INTRAVENOUS EVERY 6 HOURS PRN
Status: DISCONTINUED | OUTPATIENT
Start: 2024-11-27 | End: 2024-12-05 | Stop reason: HOSPADM

## 2024-11-27 RX ORDER — ONDANSETRON 4 MG/1
4 TABLET, ORALLY DISINTEGRATING ORAL EVERY 6 HOURS PRN
Status: DISCONTINUED | OUTPATIENT
Start: 2024-11-27 | End: 2024-12-05 | Stop reason: HOSPADM

## 2024-11-27 RX ORDER — ACETAMINOPHEN 325 MG/1
650 TABLET ORAL EVERY 4 HOURS PRN
Status: DISCONTINUED | OUTPATIENT
Start: 2024-11-27 | End: 2024-12-05 | Stop reason: HOSPADM

## 2024-11-27 RX ORDER — HYDRALAZINE HYDROCHLORIDE 20 MG/ML
10 INJECTION INTRAMUSCULAR; INTRAVENOUS EVERY 4 HOURS PRN
Status: DISCONTINUED | OUTPATIENT
Start: 2024-11-27 | End: 2024-12-05 | Stop reason: HOSPADM

## 2024-11-27 RX ORDER — HYDRALAZINE HYDROCHLORIDE 10 MG/1
10 TABLET, FILM COATED ORAL EVERY 4 HOURS PRN
Status: DISCONTINUED | OUTPATIENT
Start: 2024-11-27 | End: 2024-12-05 | Stop reason: HOSPADM

## 2024-11-27 RX ADMIN — CLINDAMYCIN PHOSPHATE 900 MG: 900 INJECTION, SOLUTION INTRAVENOUS at 16:51

## 2024-11-27 RX ADMIN — KETOROLAC TROMETHAMINE 15 MG: 15 INJECTION, SOLUTION INTRAMUSCULAR; INTRAVENOUS at 21:04

## 2024-11-27 RX ADMIN — ACETAMINOPHEN 650 MG: 325 TABLET, FILM COATED ORAL at 17:02

## 2024-11-27 RX ADMIN — CEFAZOLIN SODIUM 2 G: 2 INJECTION, SOLUTION INTRAVENOUS at 03:01

## 2024-11-27 RX ADMIN — ACETAMINOPHEN 650 MG: 325 TABLET, FILM COATED ORAL at 11:44

## 2024-11-27 RX ADMIN — ACETAMINOPHEN 650 MG: 325 TABLET, FILM COATED ORAL at 07:38

## 2024-11-27 RX ADMIN — ACETAMINOPHEN 650 MG: 325 TABLET, FILM COATED ORAL at 21:04

## 2024-11-27 RX ADMIN — CEFAZOLIN SODIUM 2 G: 2 INJECTION, SOLUTION INTRAVENOUS at 10:33

## 2024-11-27 ASSESSMENT — ACTIVITIES OF DAILY LIVING (ADL)
ADLS_ACUITY_SCORE: 15
ADLS_ACUITY_SCORE: 41
ADLS_ACUITY_SCORE: 15
ADLS_ACUITY_SCORE: 41
ADLS_ACUITY_SCORE: 22
ADLS_ACUITY_SCORE: 15
ADLS_ACUITY_SCORE: 15
ADLS_ACUITY_SCORE: 22
ADLS_ACUITY_SCORE: 41
ADLS_ACUITY_SCORE: 15
ADLS_ACUITY_SCORE: 20
ADLS_ACUITY_SCORE: 20
ADLS_ACUITY_SCORE: 22
ADLS_ACUITY_SCORE: 15
ADLS_ACUITY_SCORE: 41
ADLS_ACUITY_SCORE: 41
ADLS_ACUITY_SCORE: 15
ADLS_ACUITY_SCORE: 22
ADLS_ACUITY_SCORE: 41
ADLS_ACUITY_SCORE: 22

## 2024-11-27 NOTE — PLAN OF CARE
Goal Outcome Evaluation:      Plan of Care Reviewed With: patient    Overall Patient Progress: no change     SUMMARY: Cellulitis of Right Leg     DATE & TIME: 11/26/2024 - 11/27/2024 [id number]      Cognitive Concerns/ Orientation: A & O x4   BEHAVIOR & AGGRESSION TOOL COLOR: Green  ABNL VS/O2: VSS on RA; except /98  MOBILITY: IND  PAIN MANAGMENT: Denied pain this shift   DIET: Regular   BOWEL/BLADDER: Continent of bowel and bladder; No BM this shift   ABNL LAB/BG: See results   DRAIN/DEVICES: R PIV SL   TELEMETRY RHYTHM: N/a  SKIN: Redness RLE, dry/flaky skin    TESTS/PROCEDURES: None this shift   D/C DATE: Pending     OTHER IMPORTANT INFO:

## 2024-11-27 NOTE — PROGRESS NOTES
[facility]    Medicine Progress Note - Hospitalist Service    Date of Admission:  11/26/2024    Assessment & Plan      Se Robins is a 39 year old male admitted on 11/26/2024 for left leg cellulitis. He has a past medical history of lymphangioma and thrombophlebitis of superficial veins of right lower extremity.      Right leg cellulitis  Bacteremia  Mr. Robins presents to the ED for evaluation of right lower extremity cellulitis. He was recently seen in urgent care on 11/22/24 for this and was prescribed Augmentin. His symptoms failed to improve, prompting him to return to urgent care on 11/26/2024. He was then referred to the ED for further work up and treatment. Of note, he reports a history of blood clot in his right leg, so a duplex ultrasound was completed 11/22 which was negative for blood clots. Since the onset of his symptoms, he has been having fevers as high as 101. He received a 1l IVF bolus in the ED and has been started on IV Ancef.   *WBC: 13.5 on admission, down to 12.2  *Lactic acid:1.0  -Blood cultures with GPCs, follow final results.   -Started on IV Ancef on admission, will switch to clindamycin as blood cultures growing gram-positive cocci in clusters; cannot rule out MRSA at this time.  -Will repeat blood cultures 11/27     Observation Goals: -diagnostic tests and consults completed and resulted, -vital signs normal or at patient baseline, -adequate pain control on oral analgesics, -tolerating oral antibiotics or has plans for home infusion setup, -infection is improving, Nurse to notify provider when observation goals have been met and patient is ready for discharge.  Diet: Regular Diet Adult    DVT Prophylaxis: Pneumatic Compression Devices  Martinez Catheter: Not present  Lines: None     Cardiac Monitoring: None  Code Status: Full Code      Clinically Significant Risk Factors Present on Admission         # Hyponatremia: Lowest Na = 133 mmol/L in last 2 days, will  "monitor as appropriate  # Hypochloremia: Lowest Cl = 96 mmol/L in last 2 days, will monitor as appropriate                    # Severe Obesity: Estimated body mass index is 53.09 kg/m  as calculated from the following:    Height as of this encounter: 1.778 m (5' 10\").    Weight as of this encounter: 167.8 kg (370 lb).              Social Drivers of Health    Tobacco Use: Medium Risk (11/22/2024)    Patient History     Smoking Tobacco Use: Former     Smokeless Tobacco Use: Never          Disposition Plan     Medically Ready for Discharge: Anticipated in 2-4 Days             Marla Phelan MD  Hospitalist Service  [facility]  Securely message with [employer] (more info)  Text page via [employer] Paging/Directory   ______________________________________________________________________    Interval History   Patient laying in bed, he asked about discharging today.  Noted today with need at least 48 hours of IV antibiotics.  He states his lower extremity is getting better in terms of tenderness, also has decreased erythema    Physical Exam   Vital Signs: Temp: 98.6  F (37  C) Temp src: Oral BP: 103/69 Pulse: 117   Resp: 18 SpO2: 93 % O2 Device: None (Room air)    Weight: 370 lbs 0 oz    General Appearance: Well appearing for stated age.  Respiratory: CTAB, no rales or ronchi  Cardiovascular: S1, S2 normal, no murmurs  GI: non-tender on palpation, BS present      Medical Decision Making       55 MINUTES SPENT BY ME on the date of service doing chart review, history, exam, documentation & further activities per the note.      Data     I have personally reviewed the following data over the past 24 hrs:    12.2 (H)  \   12.5 (L)   / 357     133 (L) 97 (L) 8.2 /  147 (H)   3.8 25 0.60 (L) \     Procal: N/A CRP: 135.92 (H) Lactic Acid: 1.0         Imaging results reviewed over the past 24 hrs:   No results found for this or any previous visit (from the past 24 hours).  "

## 2024-11-27 NOTE — PROGRESS NOTES
RECEIVING UNIT ED HANDOFF REVIEW    ED Nurse Handoff Report was reviewed by: Jeanette Poon RN on November 27, 2024 at 12:18 AM

## 2024-11-27 NOTE — PROVIDER NOTIFICATION
Received critical lab result regarding pt's blood culture from yesterday at 6:30 pm being positive for Gram positive cocci in clusters. Dr. Fernández notified via [employer] messaging.

## 2024-11-27 NOTE — ED PROVIDER NOTES
"  Emergency Department Note      History of Present Illness     Chief Complaint   Leg Swelling (Right)      HPI   Se Robins is a 39 year old male with a history of lymphangioma and thrombophlebitis of superficial veins of right lower extremity who presents to the ED for evaluation of right leg cellulitis. The patient reports visiting the Urgent Care on 11/22/24 for cellulitis on his right leg and was prescribed Augmentin, but went in again today due to symptoms not improving with use of Augmentin and was instead recommended to visit the ED. He reports that the site of discomfort is red, warm, and tender to the tough with a stinging sensation. He also notes that elevation and standing caused pain in his lower right extremity. He endorses a fever with a maximum of 101 fever here at the ED accompanied by chills and shortness of breath. Patient also endorses a history of a blood clot in his right leg. Patient denies any injury to the leg, diabetes, or a history of major medical interventions or MRSA. Additionally, the patient states that he had an Ultrasound and some blood work done during his visit on 11/22/24, ruling out any blood clots.    Independent Historian   None    Review of External Notes   I reviewed the patient's Urgent Care note from today, 11/26/24. I also reviewed his duplex ultrasound of the right leg which is negative for DVT.    Past Medical History     Medical History and Problem List   Anxiety  Lymphangioma  Major depression  Thrombophlebitis of superficial veins of right lower extremity  Umbilical hernia without obstruction and without gangrene  Class 3 obesity  Injury to left shoulder    Medications   Augmentin    Physical Exam     Patient Vitals for the past 24 hrs:   BP Temp Temp src Pulse Resp SpO2 Height Weight   11/26/24 1754 138/86 (!) 100.9  F (38.3  C) Temporal 117 20 99 % 1.778 m (5' 10\") (!) 163.3 kg (360 lb)     Physical Exam  GENERAL: Patient well-appearing  Neuro: RLE Strength " 5/5. Sensation intact to light touch throughout right lower extremity.  NECK: No rigidity  CV: Tachycardic and regular  PULM: CTAB with good aeration; no retractions, rales, rhonchi, or wheezing  DERM: Diffuse blanching erythema and warmth in the right lower leg circumferentially with pitted edema 2-3+.  No crepitus, bullae, or pus.  EXTREMITY: Passive movement right lower extremity without pain.  Right leg is swollen second branch of the calf but compartments are not rigid.   VASCULAR: 1+ DP pulse in right foot    Diagnostics     Lab Results   Labs Ordered and Resulted from Time of ED Arrival to Time of ED Departure   CBC WITH PLATELETS AND DIFFERENTIAL - Abnormal       Result Value    WBC Count 13.5 (*)     RBC Count 4.69      Hemoglobin 13.7      Hematocrit 41.0      MCV 87      MCH 29.2      MCHC 33.4      RDW 13.3      Platelet Count 347      % Neutrophils 75      % Lymphocytes 13      % Monocytes 10      % Eosinophils 1      % Basophils 1      % Immature Granulocytes 1      NRBCs per 100 WBC 0      Absolute Neutrophils 10.1 (*)     Absolute Lymphocytes 1.7      Absolute Monocytes 1.3      Absolute Eosinophils 0.2      Absolute Basophils 0.1      Absolute Immature Granulocytes 0.1      Absolute NRBCs 0.0     LACTIC ACID WHOLE BLOOD WITH 1X REPEAT IN 2 HR WHEN >2 - Normal    Lactic Acid, Initial 1.0     BASIC METABOLIC PANEL   BLOOD CULTURE       Imaging   No orders to display     Independent Interpretation   None    ED Course      Medications Administered   Medications   ceFAZolin (ANCEF) 2 g in 100 mL D5W intermittent infusion (2 g Intravenous $New Bag 11/26/24 1837)   sodium chloride 0.9% BOLUS 1,000 mL (1,000 mLs Intravenous $New Bag 11/26/24 1858)   acetaminophen (TYLENOL) tablet 1,000 mg (1,000 mg Oral $Given 11/26/24 1900)       Procedures   Procedures     Discussion of Management   Admitting Hospitalist, Dr. Patton  and Dr. Ramirez    ED Course   ED Course as of 11/26/24 1903 Tue Nov 26, 2024 1805 I  obtained history and examined the patient as noted above     1853 Dr. Ramirez with the hospitalist team was assigned was assigned to patient for admission to observation.   1901 I spoke with Dr. Ramirez with the hospitalist team for admission and plan of care.   1901 Dr. Patton was assigned as admitting hospitalist       Additional Documentation  None    Medical Decision Making / Diagnosis     CMS Diagnoses: None    MIPS       None    MDM   Se Robins is a 39 year old male     Symptoms consistent with cellulitis.  I suspect patient may have a degree of lymphedema which could be contributing to this.    DDx considered sepsis, osteomyelitis, necrotizing skin infection, compartment syndrome, DVT, however evaluation not consistent with these etiologies.    Basic labs and leukocytosis.  Lactate within normal limits.  Blood culture drawn.  Given IV fluids, Tylenol, and IV cefazolin.  No history of MRSA.  Do not see evidence of septic shock nor severe sepsis.    Had a duplex ultrasound this past Friday that was negative for DVT and clinical picture is most consistent with cellulitis and a DVT.  Discussed with hospitalist team and patient to be admitted.    Disposition   The patient was admitted to the hospital.     Diagnosis     ICD-10-CM    1. Cellulitis of right leg  L03.115            Discharge Medications   New Prescriptions    No medications on file         Scribe Disclosure:  I, Barak Rajput, am serving as a scribe at 6:36 PM on 11/26/2024 to document services personally performed by Luis Felipe Jordan MD based on my observations and the provider's statements to me.        Luis Felipe Jordan MD  11/26/24 1915

## 2024-11-27 NOTE — PHARMACY-ADMISSION MEDICATION HISTORY
Pharmacist Admission Medication History    Admission medication history is complete. The information provided in this note is only as accurate as the sources available at the time of the update.    Information Source(s): Patient and Family member via in-person    Pertinent Information: None    Changes made to PTA medication list:  Added: None  Deleted: Roflumilast  Changed: Amlactin to PRN, Hydrocortisone/Ketoconazole to PRN.    Allergies reviewed with patient and updates made in EHR: yes    Medication History Completed By: Brenda Hernandez RPH 11/26/2024 8:00 PM    PTA Med List   Medication Sig Last Dose/Taking    ammonium lactate (AMLACTIN) 12 % external cream Apply topically 2 times daily. (Patient taking differently: Apply topically daily as needed for dry skin.) More than a month    amoxicillin-clavulanate (AUGMENTIN) 875-125 MG tablet Take 1 tablet by mouth 2 times daily for 10 days. 11/26/2024 Morning    hydrocortisone 2.5 % cream MIX 1:1 WITH 2% Ketoconazole CREAM AND APPLY TO AFFECTED AREAS TWICE DAILY FOR UP TO 14 DAYS (Patient taking differently: Apply topically 2 times daily as needed for rash. MIX 1:1 WITH 2% Ketoconazole CREAM AND APPLY TO AFFECTED AREAS TWICE DAILY FOR UP TO 14 DAYS) Past Month    ketoconazole (NIZORAL) 2 % external cream MIX 1:1 WITH 2.5 % HYDROCORTISONE CREAM AND APPLY TO AFFECTED AREAS TWICE DAILY FOR UP TO 14 DAYS (Patient taking differently: Apply topically 2 times daily as needed for irritation. MIX 1:1 WITH 2.5 % HYDROCORTISONE CREAM AND APPLY TO AFFECTED AREAS TWICE DAILY FOR UP TO 14 DAYS) Past Month

## 2024-11-27 NOTE — ED NOTES
[facility]    ED Nurse Handoff Report    ED Chief complaint: Leg Swelling (Right)      ED Diagnosis:   Final diagnoses:   Cellulitis of right leg       Code Status: to be determined by provider    Allergies: No Known Allergies    Patient Story:  Right leg cellulitis. Was sent here from [facility]     Patient does not want to stay if possible. Has a flight to catch tomorrow.    Focused Assessment:    Pt alert and oriented x4, ambulates independently.     Labs Ordered and Resulted from Time of ED Arrival to Time of ED Departure   CBC WITH PLATELETS AND DIFFERENTIAL - Abnormal       Result Value    WBC Count 13.5 (*)     RBC Count 4.69      Hemoglobin 13.7      Hematocrit 41.0      MCV 87      MCH 29.2      MCHC 33.4      RDW 13.3      Platelet Count 347      % Neutrophils 75      % Lymphocytes 13      % Monocytes 10      % Eosinophils 1      % Basophils 1      % Immature Granulocytes 1      NRBCs per 100 WBC 0      Absolute Neutrophils 10.1 (*)     Absolute Lymphocytes 1.7      Absolute Monocytes 1.3      Absolute Eosinophils 0.2      Absolute Basophils 0.1      Absolute Immature Granulocytes 0.1      Absolute NRBCs 0.0     LACTIC ACID WHOLE BLOOD WITH 1X REPEAT IN 2 HR WHEN >2 - Normal    Lactic Acid, Initial 1.0     BASIC METABOLIC PANEL   BLOOD CULTURE       No orders to display         Treatments and/or interventions provided:      Medications   sodium chloride 0.9% BOLUS 1,000 mL (1,000 mLs Intravenous $New Bag 11/26/24 1858)   ceFAZolin (ANCEF) 2 g in 100 mL D5W intermittent infusion (0 g Intravenous Stopped 11/26/24 1904)   acetaminophen (TYLENOL) tablet 1,000 mg (1,000 mg Oral $Given 11/26/24 1900)       Patient's response to treatments and/or interventions:    Tolerating wll    To be done/followed up on inpatient unit:   See any in-patient orders    Does this patient have any cognitive concerns?:  n/a    Activity level - Baseline/Home:    Independent    Activity Level - Current:   "  Independent    Patient's Preferred language: English     Needed?: No    Isolation: None  Infection: Not Applicable  Patient tested for COVID 19 prior to admission: NO    Bariatric?: Yes    Vital Signs:   Vitals:    11/26/24 1754   BP: 138/86   Pulse: 117   Resp: 20   Temp: (!) 100.9  F (38.3  C)   TempSrc: Temporal   SpO2: 99%   Weight: (!) 163.3 kg (360 lb)   Height: 1.778 m (5' 10\")       Cardiac Rhythm:     Was the PSS-3 completed:   Yes  What interventions are required if any?               Family Comments: none present  OBS brochure/video discussed/provided to patient/family: Yes        For the majority of the shift this patient's behavior was Green.      ED NURSE PHONE NUMBER: [phone]    "

## 2024-11-27 NOTE — PROGRESS NOTES
Admission    Patient arrives to room 2407 via cart from ED.  Care plan note: Done    Inpatient nursing criteria listed below were met:    Did you put disposition on whiteboard and in sticky note: Yes  Full skin assessment done (add LDA if skin issue present). Initials of 2nd RN MR :Yes  Isolation education started/completed NA  Patient allergies verified with patient: Yes  Fall Risk? (Care plan updated, Education given and documented) No  Primary Care Plan initiated: Yes  Home medications documented in belongings flowsheet: NA  Patient belongings documented in belongings flowsheet: Yes  Reminder note (belongings/ medications) placed in discharge instructions:Yes  Admission profile/ required documentation complete: Yes  If patient is a 72 hour hold/Commitment are belongings removed from room and locked up? NA

## 2024-11-27 NOTE — PLAN OF CARE
Pt A&Ox4; VSS; RLE warm, reddened and swollen. CMS intact. Continues on IV abx. Up independently. Tolerating PO. Voiding without difficulties. Will continue to monitor.

## 2024-11-27 NOTE — H&P
"[facility]    History and Physical - Hospitalist Service       Date of Admission:  11/26/2024    Assessment & Plan      Se Robins is a 39 year old male admitted on 11/26/2024 for left leg cellulitis. He has a past medical history of lymphangioma and thrombophlebitis of superficial veins of right lower extremity.     Right leg cellulitis  Mr. Robins presents to the ED for evaluation of right lower extremity cellulitis. He was recently seen in urgent care on 11/22/24 for this and was prescribed Augmentin. His symptoms failed to improve, prompting him to return to urgent care on 11/26/2024. He was then referred to the ED for further work up and treatment. Of note, he reports a history of blood clot in his right leg, so a duplex ultrasound was completed 11/22 which was negative for blood clots. Since the onset of his symptoms, he has been having fevers as high as 101. He received a 1l IVF bolus in the ED and has been started on IV Ancef.   *WBC: 13.5  *Lactic acid:1.0  -Admit to observation  -Q4h vital signs  -Blood cultures  -IV Ancef  -Recheck CBC in AM 11/27/2024  -Q4h CMS checks  -Recheck CBC 11/27/2024  -PO tylenol for pain and fever        Diet:  Regular  DVT Prophylaxis: Pneumatic Compression Devices  Martinez Catheter: Not present  Lines: None     Cardiac Monitoring: None  Code Status:  Full Code    Clinically Significant Risk Factors Present on Admission                             # Severe Obesity: Estimated body mass index is 51.65 kg/m  as calculated from the following:    Height as of this encounter: 1.778 m (5' 10\").    Weight as of this encounter: 163.3 kg (360 lb).            Disposition Plan   Medically Ready for Discharge: Anticipated Tomorrow     The patient's care was discussed with the Attending Physician, Dr. Patton .    Champ Ramirez NP  Hospitalist Service  [facility]  Securely message with [employer] (more info)  Text page via [employer] " Paging/Directory   ______________________________________________________________________    Chief Complaint   Right lower extremity cellulitis    History is obtained from the patient    History of Present Illness   Se Robins is a 39 year old male who presents from an urgent care clinic with right lower extremity cellulitis. He was recently seen in urgent care on 11/22/24 for the same issue. At that time, he was prescribed a 10 day course of Augmentin. However, his symptoms failed to improve, so on 11/26/2024 he returned to urgent care, but was advised to come to the ED. He reports fever and chills as well as pain, tenderness and a stinging sensation at the site, which is notably warm and erythematous.  Of note, he does have a history of blood clots in his right leg. However, on 11/22/24, a duplex ultrasound was completed in the urgent care clinic and did not show any DVT. CMS are intact and he denies any calf tenderness. He also denies any chest pain, shortness of breath, abdominal pain or nausea. No history of MRSA, recent hospitalization or invasive procedures.  Plan to continue IV Ancef while blood cultures are in process and monitor the site to ensure it does not continue to spread. Will treat pain and fever with tylenol.      Past Medical History    Past Medical History:   Diagnosis Date    Anxiety     Lymphangioma     per pt this was the diagnosis of his skin below the axilla on the right    Major depression 1/6/2014    Thrombophlebitis of superficial veins of right lower extremity 8/14/2017    Greater saphenous vein.     Past Surgical History   No past surgical history on file.    Prior to Admission Medications   Prior to Admission Medications   Prescriptions Last Dose Informant Patient Reported? Taking?   Roflumilast, Antiseborrheic, 0.3 % FOAM   No No   Sig: Externally apply 1 Application topically daily Apply once daily   ammonium lactate (AMLACTIN) 12 % external cream   No No   Sig: Apply topically  2 times daily.   amoxicillin-clavulanate (AUGMENTIN) 875-125 MG tablet   No No   Sig: Take 1 tablet by mouth 2 times daily for 10 days.   hydrocortisone 2.5 % cream   No No   Sig: MIX 1:1 WITH 2% Ketoconazole CREAM AND APPLY TO AFFECTED AREAS TWICE DAILY FOR UP TO 14 DAYS   ketoconazole (NIZORAL) 2 % external cream   No No   Sig: MIX 1:1 WITH 2.5 % HYDROCORTISONE CREAM AND APPLY TO AFFECTED AREAS TWICE DAILY FOR UP TO 14 DAYS   ketoconazole (NIZORAL) 2 % external shampoo   No No   Sig: Massage into wet scalp, let sit 3-5 min, then rinse. Do this 3x weekly.      Facility-Administered Medications: None      Physical Exam   Vital Signs: Temp: (!) 100.9  F (38.3  C) Temp src: Temporal BP: 138/86 Pulse: 117   Resp: 20 SpO2: 99 % O2 Device: None (Room air)    Weight: 360 lbs 0 oz  Physical Exam  Vitals reviewed.   Constitutional:       General: He is not in acute distress.  Cardiovascular:      Rate and Rhythm: Regular rhythm. Tachycardia present.      Pulses: Normal pulses.      Heart sounds: Normal heart sounds.   Pulmonary:      Effort: Pulmonary effort is normal.      Breath sounds: Normal breath sounds and air entry.   Abdominal:      General: Abdomen is protuberant. Bowel sounds are normal.      Palpations: Abdomen is soft.      Tenderness: There is no abdominal tenderness.   Musculoskeletal:      Right lower leg: Swelling and tenderness present.      Right foot: Swelling present.      Left foot: Swelling present.      Comments: RLE swelling and erythema   Skin:     General: Skin is warm and dry.      Comments: RLE erythema   Neurological:      General: No focal deficit present.      Mental Status: He is alert.   Psychiatric:         Attention and Perception: Attention normal.         Mood and Affect: Mood normal.         Speech: Speech normal.         Behavior: Behavior normal. Behavior is cooperative.         Thought Content: Thought content normal.         Cognition and Memory: Cognition normal.         Judgment:  Judgment normal.     Medical Decision Making   75 MINUTES SPENT BY ME on the date of service doing chart review, history, exam, documentation & further activities per the note.      Data     I have personally reviewed the following data over the past 24 hrs:    13.5 (H)  \   13.7   / 347     133 (L) 96 (L) 9.9 /  105 (H)   4.4 27 0.68 \     Procal: N/A CRP: N/A Lactic Acid: 1.0         Imaging results reviewed over the past 24 hrs:   No results found for this or any previous visit (from the past 24 hours).

## 2024-11-28 ENCOUNTER — APPOINTMENT (OUTPATIENT)
Dept: CT IMAGING | Facility: CLINIC | Age: 39
End: 2024-11-28
Attending: HOSPITALIST
Payer: COMMERCIAL

## 2024-11-28 VITALS
HEIGHT: 70 IN | TEMPERATURE: 99.7 F | SYSTOLIC BLOOD PRESSURE: 139 MMHG | WEIGHT: 315 LBS | HEART RATE: 108 BPM | RESPIRATION RATE: 18 BRPM | OXYGEN SATURATION: 96 % | DIASTOLIC BLOOD PRESSURE: 73 MMHG | BODY MASS INDEX: 45.1 KG/M2

## 2024-11-28 LAB
ANION GAP SERPL CALCULATED.3IONS-SCNC: 11 MMOL/L (ref 7–15)
BACTERIA BLD CULT: ABNORMAL
BACTERIA BLD CULT: ABNORMAL
BACTERIA BLD CULT: NORMAL
BASOPHILS # BLD AUTO: 0.1 10E3/UL (ref 0–0.2)
BASOPHILS NFR BLD AUTO: 0 %
BUN SERPL-MCNC: 8.9 MG/DL (ref 6–20)
CALCIUM SERPL-MCNC: 8.8 MG/DL (ref 8.8–10.4)
CHLORIDE SERPL-SCNC: 100 MMOL/L (ref 98–107)
CREAT SERPL-MCNC: 0.63 MG/DL (ref 0.67–1.17)
CRP SERPL-MCNC: 151.02 MG/L
EGFRCR SERPLBLD CKD-EPI 2021: >90 ML/MIN/1.73M2
EOSINOPHIL # BLD AUTO: 0.1 10E3/UL (ref 0–0.7)
EOSINOPHIL NFR BLD AUTO: 1 %
ERYTHROCYTE [DISTWIDTH] IN BLOOD BY AUTOMATED COUNT: 13.2 % (ref 10–15)
GLUCOSE SERPL-MCNC: 134 MG/DL (ref 70–99)
HCO3 SERPL-SCNC: 25 MMOL/L (ref 22–29)
HCT VFR BLD AUTO: 38 % (ref 40–53)
HGB BLD-MCNC: 12.6 G/DL (ref 13.3–17.7)
IMM GRANULOCYTES # BLD: 0.1 10E3/UL
IMM GRANULOCYTES NFR BLD: 1 %
LYMPHOCYTES # BLD AUTO: 1.7 10E3/UL (ref 0.8–5.3)
LYMPHOCYTES NFR BLD AUTO: 15 %
MCH RBC QN AUTO: 28.8 PG (ref 26.5–33)
MCHC RBC AUTO-ENTMCNC: 33.2 G/DL (ref 31.5–36.5)
MCV RBC AUTO: 87 FL (ref 78–100)
MONOCYTES # BLD AUTO: 1 10E3/UL (ref 0–1.3)
MONOCYTES NFR BLD AUTO: 9 %
NEUTROPHILS # BLD AUTO: 8.2 10E3/UL (ref 1.6–8.3)
NEUTROPHILS NFR BLD AUTO: 74 %
NRBC # BLD AUTO: 0 10E3/UL
NRBC BLD AUTO-RTO: 0 /100
PLATELET # BLD AUTO: 341 10E3/UL (ref 150–450)
POTASSIUM SERPL-SCNC: 4.2 MMOL/L (ref 3.4–5.3)
RBC # BLD AUTO: 4.38 10E6/UL (ref 4.4–5.9)
SODIUM SERPL-SCNC: 136 MMOL/L (ref 135–145)
WBC # BLD AUTO: 11.1 10E3/UL (ref 4–11)

## 2024-11-28 PROCEDURE — 73701 CT LOWER EXTREMITY W/DYE: CPT | Mod: RT

## 2024-11-28 PROCEDURE — 250N000013 HC RX MED GY IP 250 OP 250 PS 637

## 2024-11-28 PROCEDURE — 250N000011 HC RX IP 250 OP 636: Performed by: HOSPITALIST

## 2024-11-28 PROCEDURE — 80048 BASIC METABOLIC PNL TOTAL CA: CPT | Performed by: HOSPITALIST

## 2024-11-28 PROCEDURE — 120N000001 HC R&B MED SURG/OB

## 2024-11-28 PROCEDURE — 250N000011 HC RX IP 250 OP 636: Performed by: INTERNAL MEDICINE

## 2024-11-28 PROCEDURE — 250N000009 HC RX 250: Performed by: HOSPITALIST

## 2024-11-28 PROCEDURE — 86140 C-REACTIVE PROTEIN: CPT | Performed by: HOSPITALIST

## 2024-11-28 PROCEDURE — 250N000011 HC RX IP 250 OP 636

## 2024-11-28 PROCEDURE — 250N000013 HC RX MED GY IP 250 OP 250 PS 637: Performed by: HOSPITALIST

## 2024-11-28 PROCEDURE — 82565 ASSAY OF CREATININE: CPT | Performed by: HOSPITALIST

## 2024-11-28 PROCEDURE — 85025 COMPLETE CBC W/AUTO DIFF WBC: CPT | Performed by: HOSPITALIST

## 2024-11-28 PROCEDURE — 99232 SBSQ HOSP IP/OBS MODERATE 35: CPT | Performed by: HOSPITALIST

## 2024-11-28 PROCEDURE — 36415 COLL VENOUS BLD VENIPUNCTURE: CPT | Performed by: HOSPITALIST

## 2024-11-28 RX ORDER — IOPAMIDOL 755 MG/ML
90 INJECTION, SOLUTION INTRAVASCULAR ONCE
Status: COMPLETED | OUTPATIENT
Start: 2024-11-28 | End: 2024-11-28

## 2024-11-28 RX ORDER — KETOROLAC TROMETHAMINE 15 MG/ML
15 INJECTION, SOLUTION INTRAMUSCULAR; INTRAVENOUS ONCE
Status: COMPLETED | OUTPATIENT
Start: 2024-11-28 | End: 2024-11-28

## 2024-11-28 RX ORDER — CEFTRIAXONE 2 G/1
2 INJECTION, POWDER, FOR SOLUTION INTRAMUSCULAR; INTRAVENOUS EVERY 24 HOURS
Status: DISCONTINUED | OUTPATIENT
Start: 2024-11-28 | End: 2024-12-01

## 2024-11-28 RX ORDER — IBUPROFEN 400 MG/1
400 TABLET, FILM COATED ORAL EVERY 6 HOURS PRN
Status: DISCONTINUED | OUTPATIENT
Start: 2024-11-28 | End: 2024-12-05 | Stop reason: HOSPADM

## 2024-11-28 RX ADMIN — ACETAMINOPHEN 650 MG: 325 TABLET, FILM COATED ORAL at 06:53

## 2024-11-28 RX ADMIN — IBUPROFEN 400 MG: 400 TABLET, FILM COATED ORAL at 13:14

## 2024-11-28 RX ADMIN — CEFTRIAXONE SODIUM 2 G: 2 INJECTION, POWDER, FOR SOLUTION INTRAMUSCULAR; INTRAVENOUS at 13:14

## 2024-11-28 RX ADMIN — SODIUM CHLORIDE 70 ML: 9 INJECTION, SOLUTION INTRAVENOUS at 17:38

## 2024-11-28 RX ADMIN — ONDANSETRON 4 MG: 2 INJECTION, SOLUTION INTRAMUSCULAR; INTRAVENOUS at 17:30

## 2024-11-28 RX ADMIN — ACETAMINOPHEN 650 MG: 325 TABLET, FILM COATED ORAL at 17:59

## 2024-11-28 RX ADMIN — CLINDAMYCIN PHOSPHATE 900 MG: 900 INJECTION, SOLUTION INTRAVENOUS at 10:13

## 2024-11-28 RX ADMIN — IOPAMIDOL 90 ML: 755 INJECTION, SOLUTION INTRAVENOUS at 17:38

## 2024-11-28 RX ADMIN — ACETAMINOPHEN 650 MG: 325 TABLET, FILM COATED ORAL at 10:47

## 2024-11-28 RX ADMIN — CLINDAMYCIN PHOSPHATE 900 MG: 900 INJECTION, SOLUTION INTRAVENOUS at 01:01

## 2024-11-28 RX ADMIN — KETOROLAC TROMETHAMINE 15 MG: 15 INJECTION, SOLUTION INTRAMUSCULAR; INTRAVENOUS at 21:45

## 2024-11-28 ASSESSMENT — ACTIVITIES OF DAILY LIVING (ADL)
ADLS_ACUITY_SCORE: 22

## 2024-11-28 NOTE — PLAN OF CARE
Goal Outcome Evaluation:      Plan of Care Reviewed With: patient    Overall Patient Progress: improvingOverall Patient Progress: improving         Shift:[id number]    Summary:Right Leg Cellulitis  Hx lymphangioma and thrombophlebitis of superficial veins of right lower extremity.    Orientation: A& O x 4     Vitals/Tele :VSS on room air ,pain managed with PRN Tylenol x1       IV Access/drains: PIV SL with intermittent ABX      Diet: Regular      Mobility Independent      GI/ Continent of B/B no BM this shift     Wound/Skin: Cellulitis RLE Redness,dry skin on feet/ankles       Consults :None      Discharge Plan Pending

## 2024-11-28 NOTE — PROGRESS NOTES
[facility]    Medicine Progress Note - Hospitalist Service    Date of Admission:  11/26/2024    Assessment & Plan      Se Robins is a 39 year old male admitted on 11/26/2024 for left leg cellulitis. He has a past medical history of lymphangioma and thrombophlebitis of superficial veins of right lower extremity.      Right leg cellulitis  Staph epi Bacteremia  Mr. Robins presents to the ED for evaluation of right lower extremity cellulitis. He was recently seen in urgent care on 11/22/24 for this and was prescribed Augmentin. His symptoms failed to improve, prompting him to return to urgent care on 11/26/2024. He was then referred to the ED for further work up and treatment. Of note, he reports a history of blood clot in his right leg, so a duplex ultrasound was completed 11/22 which was negative for blood clots. Since the onset of his symptoms, he has been having fevers as high as 101. He received a 1l IVF bolus in the ED and has been started on IV Ancef.   *WBC: 13.5 on admission, down to 11.2  *Lactic acid:1.0  -Blood cultures with staph epiderdimis  -Started on IV Ancef on admission, switched to clindamycin as blood cultures growing gram-positive cocci in clusters; cannot rule out MRSA at this time.  - Switch abx back to ceftriaxone from clinda as patients pain has worsened.   -repeat blood cultures 11/27 NGTD  - obtain CT of right lower leg  - Consider ID consult in am  - DVT was reportedly ruled out at urgent care, prior to admission.         Diet: Regular Diet Adult    DVT Prophylaxis: Pneumatic Compression Devices  Martinez Catheter: Not present  Lines: None     Cardiac Monitoring: None  Code Status: Full Code      Clinically Significant Risk Factors Present on Admission         # Hyponatremia: Lowest Na = 133 mmol/L in last 2 days, will monitor as appropriate  # Hypochloremia: Lowest Cl = 96 mmol/L in last 2 days, will monitor as appropriate                    # Severe Obesity:  "Estimated body mass index is 53.09 kg/m  as calculated from the following:    Height as of this encounter: 1.778 m (5' 10\").    Weight as of this encounter: 167.8 kg (370 lb).              Social Drivers of Health    Tobacco Use: Medium Risk (11/22/2024)    Patient History     Smoking Tobacco Use: Former     Smokeless Tobacco Use: Never          Disposition Plan     Medically Ready for Discharge: Anticipated in 2-4 Days             Marla Phelan MD  Hospitalist Service  [facility]  Securely message with [employer] (more info)  Text page via [employer] Paging/Directory   ______________________________________________________________________    Interval History   Patient laying in bed, wife at bedside. They note worsening cellulitis and erythema beyond the line of demarcation. Pain initially improved but worsened yesterday.   Will discontinue clindamycin and switch back to rocephin.  Obtain CT of the leg to further eval      Physical Exam   Vital Signs: Temp: 98.2  F (36.8  C) Temp src: Oral BP: 127/89 Pulse: 108   Resp: 18 SpO2: 93 % O2 Device: None (Room air)    Weight: 370 lbs 0 oz    General Appearance: Well appearing for stated age.  Respiratory: CTAB, no rales or ronchi  Cardiovascular: S1, S2 normal, no murmurs  GI: non-tender on palpation, BS present      Medical Decision Making       55 MINUTES SPENT BY ME on the date of service doing chart review, history, exam, documentation & further activities per the note.      Data     I have personally reviewed the following data over the past 24 hrs:    12.2 (H)  \   12.5 (L)   / 357     133 (L) 97 (L) 8.2 /  147 (H)   3.8 25 0.60 (L) \     Procal: N/A CRP: 135.92 (H) Lactic Acid: N/A         Imaging results reviewed over the past 24 hrs:   No results found for this or any previous visit (from the past 24 hours).  "

## 2024-11-28 NOTE — PLAN OF CARE
Goal Outcome Evaluation:      Plan of Care Reviewed With: patient    Overall Patient Progress: no changeOverall Patient Progress: no change         Summary R leg cellulitis    Hx lymphangioma and thrombophlebitis of superficial veins of right lower extremity    11/27/2024 [id number]     Orientation A& O x 4    Vitals/Tele VSS on RM air     Pain for headache & fever  managed with tylenol and Toradol for increased leg pain     IV Access/drains PIV SL with intermittent ABX     Diet Reg ( family/friend brought in food)     Mobility Ind     GI/ Continent of B/B no BM this admission     Wound/Skin Cellulitis RLE Redness, marked the other day but had gotten worse which is why he came in, dry skin on feet/ankles      Consults no new     Discharge Plan Pending       Lab result Positive blood culture staphylococcus epidermidis from lab draw 11/26 @ 1252 paged  and no new orders but new ABX was started earlier.    See Flow sheets for assessment

## 2024-11-28 NOTE — PROVIDER NOTIFICATION
MD Notification    Notified Person: MD    Notified Person Name: Marla Fernández    Notification Date/Time: 5:55pm 11/27/2024     Notification Interaction: Vocalejandra     Purpose of Notification: New Lab results Positive blood culture staphylococcus epidermidis    Orders Received: No new orders     Comments:

## 2024-11-28 NOTE — PROVIDER NOTIFICATION
MD Notification    Notified Person: MD    Notified Person Name: Zuri Chaparro    Notification Date/Time: 11/27/2024 8:30pm     Notification Interaction: Vocera     Purpose of Notification: Pt is having increased pain     Orders Received: Toradol 1x dose     Comments:

## 2024-11-28 NOTE — PLAN OF CARE
Goal Outcome Evaluation:    SUMMARY: Cellulitis of Right Leg     DATE & TIME: 11/28/2024  [id number]      Cognitive Concerns/ Orientation: A & O x4   BEHAVIOR & AGGRESSION TOOL COLOR: Green  ABNL VS/O2: VSS on RA ex tachy, afebrile  MOBILITY: IND  PAIN MANAGMENT: RLE pain and headache, managed with PRN tylenol and ibuprofen  DIET: Regular   BOWEL/BLADDER: Continent   ABNL LAB/BG: ; BC NGTD  DRAIN/DEVICES: R PIV SL w/  int abx  TELEMETRY RHYTHM: N/a  SKIN: Redness RLE cellulitis, red and swollen, marked- spreading up towards knee.  TESTS/PROCEDURES: None this shift   D/C DATE: Pending     OTHER IMPORTANT INFO: CMS intact. CT tibia fibula completed this shift

## 2024-11-29 ENCOUNTER — APPOINTMENT (OUTPATIENT)
Dept: CT IMAGING | Facility: CLINIC | Age: 39
End: 2024-11-29
Attending: HOSPITALIST
Payer: COMMERCIAL

## 2024-11-29 LAB
ANION GAP SERPL CALCULATED.3IONS-SCNC: 10 MMOL/L (ref 7–15)
BACTERIA BLD CULT: ABNORMAL
BACTERIA BLD CULT: ABNORMAL
BASOPHILS # BLD AUTO: 0.1 10E3/UL (ref 0–0.2)
BASOPHILS NFR BLD AUTO: 0 %
BUN SERPL-MCNC: 8.4 MG/DL (ref 6–20)
CALCIUM SERPL-MCNC: 9.1 MG/DL (ref 8.8–10.4)
CHLORIDE SERPL-SCNC: 98 MMOL/L (ref 98–107)
CREAT SERPL-MCNC: 0.59 MG/DL (ref 0.67–1.17)
CRP SERPL-MCNC: 147.52 MG/L
EGFRCR SERPLBLD CKD-EPI 2021: >90 ML/MIN/1.73M2
EOSINOPHIL # BLD AUTO: 0.2 10E3/UL (ref 0–0.7)
EOSINOPHIL NFR BLD AUTO: 2 %
ERYTHROCYTE [DISTWIDTH] IN BLOOD BY AUTOMATED COUNT: 13.3 % (ref 10–15)
GLUCOSE SERPL-MCNC: 118 MG/DL (ref 70–99)
HCO3 SERPL-SCNC: 26 MMOL/L (ref 22–29)
HCT VFR BLD AUTO: 38.9 % (ref 40–53)
HGB BLD-MCNC: 12.8 G/DL (ref 13.3–17.7)
IMM GRANULOCYTES # BLD: 0.1 10E3/UL
IMM GRANULOCYTES NFR BLD: 0 %
LYMPHOCYTES # BLD AUTO: 1.6 10E3/UL (ref 0.8–5.3)
LYMPHOCYTES NFR BLD AUTO: 13 %
MCH RBC QN AUTO: 29 PG (ref 26.5–33)
MCHC RBC AUTO-ENTMCNC: 32.9 G/DL (ref 31.5–36.5)
MCV RBC AUTO: 88 FL (ref 78–100)
MONOCYTES # BLD AUTO: 1.2 10E3/UL (ref 0–1.3)
MONOCYTES NFR BLD AUTO: 10 %
NEUTROPHILS # BLD AUTO: 9.1 10E3/UL (ref 1.6–8.3)
NEUTROPHILS NFR BLD AUTO: 74 %
NRBC # BLD AUTO: 0 10E3/UL
NRBC BLD AUTO-RTO: 0 /100
PLATELET # BLD AUTO: 363 10E3/UL (ref 150–450)
POTASSIUM SERPL-SCNC: 4.2 MMOL/L (ref 3.4–5.3)
RBC # BLD AUTO: 4.41 10E6/UL (ref 4.4–5.9)
SODIUM SERPL-SCNC: 134 MMOL/L (ref 135–145)
WBC # BLD AUTO: 12.2 10E3/UL (ref 4–11)

## 2024-11-29 PROCEDURE — 85048 AUTOMATED LEUKOCYTE COUNT: CPT | Performed by: HOSPITALIST

## 2024-11-29 PROCEDURE — 250N000013 HC RX MED GY IP 250 OP 250 PS 637

## 2024-11-29 PROCEDURE — 250N000013 HC RX MED GY IP 250 OP 250 PS 637: Performed by: HOSPITALIST

## 2024-11-29 PROCEDURE — 85004 AUTOMATED DIFF WBC COUNT: CPT | Performed by: HOSPITALIST

## 2024-11-29 PROCEDURE — 99232 SBSQ HOSP IP/OBS MODERATE 35: CPT | Performed by: HOSPITALIST

## 2024-11-29 PROCEDURE — 120N000001 HC R&B MED SURG/OB

## 2024-11-29 PROCEDURE — 36415 COLL VENOUS BLD VENIPUNCTURE: CPT | Performed by: HOSPITALIST

## 2024-11-29 PROCEDURE — 250N000011 HC RX IP 250 OP 636: Performed by: INTERNAL MEDICINE

## 2024-11-29 PROCEDURE — 80048 BASIC METABOLIC PNL TOTAL CA: CPT | Performed by: HOSPITALIST

## 2024-11-29 PROCEDURE — 86140 C-REACTIVE PROTEIN: CPT | Performed by: HOSPITALIST

## 2024-11-29 PROCEDURE — 99222 1ST HOSP IP/OBS MODERATE 55: CPT | Performed by: INTERNAL MEDICINE

## 2024-11-29 PROCEDURE — 70450 CT HEAD/BRAIN W/O DYE: CPT

## 2024-11-29 PROCEDURE — 250N000011 HC RX IP 250 OP 636: Performed by: HOSPITALIST

## 2024-11-29 RX ORDER — CLINDAMYCIN PHOSPHATE 900 MG/50ML
900 INJECTION, SOLUTION INTRAVENOUS EVERY 8 HOURS
Status: DISCONTINUED | OUTPATIENT
Start: 2024-11-29 | End: 2024-12-01

## 2024-11-29 RX ADMIN — ACETAMINOPHEN 650 MG: 325 TABLET, FILM COATED ORAL at 16:17

## 2024-11-29 RX ADMIN — CEFTRIAXONE SODIUM 2 G: 2 INJECTION, POWDER, FOR SOLUTION INTRAMUSCULAR; INTRAVENOUS at 14:14

## 2024-11-29 RX ADMIN — CLINDAMYCIN PHOSPHATE 900 MG: 900 INJECTION, SOLUTION INTRAVENOUS at 20:29

## 2024-11-29 RX ADMIN — ACETAMINOPHEN 650 MG: 325 TABLET, FILM COATED ORAL at 05:41

## 2024-11-29 RX ADMIN — IBUPROFEN 400 MG: 400 TABLET, FILM COATED ORAL at 21:16

## 2024-11-29 RX ADMIN — CLINDAMYCIN PHOSPHATE 900 MG: 900 INJECTION, SOLUTION INTRAVENOUS at 13:39

## 2024-11-29 RX ADMIN — IBUPROFEN 400 MG: 400 TABLET, FILM COATED ORAL at 09:03

## 2024-11-29 ASSESSMENT — ACTIVITIES OF DAILY LIVING (ADL)
ADLS_ACUITY_SCORE: 22

## 2024-11-29 NOTE — PROVIDER NOTIFICATION
"MD Notification    Notified Person: MD    Notified Person Name: Dr. Phelan    Notification Date/Time: 11/29/24 at 1556    Notification Interaction: vocera    Purpose of Notification: \"Patient requesting if he can have PRN Tordol available at least one time each night as he has needed it for RLE leg pain for the last 2 nights. Cross coverage has only been ordering 1x doses so he doesnt have any available for tonight if needed\"    Orders Received: acknowledged; no new orders    Comments:    "

## 2024-11-29 NOTE — PLAN OF CARE
Goal Outcome Evaluation:  DATE & TIME:11/28/24, [id number]  Cognitive Concerns/ Orientation:A/Ox4  BEHAVIOR & AGGRESSION TOOL COLOR:Green, calm and cooperative  ABNL VS/O2:VSS on RA ex tachy  MOBILITY:Independent  PAIN MANAGMENT:Given Toradol x1, tylenol  DIET:Regular  BOWEL/BLADDER:Continent of B/B  DRAIN/DEVICES:PIV SL  TELEMETRY RHYTHM:n/a  SKIN: Cellulitis RLE  TESTS/PROCEDURES:none  D/C DATE: Pending

## 2024-11-29 NOTE — PROGRESS NOTES
[facility]    Medicine Progress Note - Hospitalist Service    Date of Admission:  11/26/2024    Assessment & Plan      Se Robins is a 39 year old male admitted on 11/26/2024 for left leg cellulitis. He has a past medical history of lymphangioma and thrombophlebitis of superficial veins of right lower extremity.      Right leg cellulitis  Staph epi Bacteremia  Mr. Robins presents to the ED for evaluation of right lower extremity cellulitis. He was recently seen in urgent care on 11/22/24 for this and was prescribed Augmentin. His symptoms failed to improve, prompting him to return to urgent care on 11/26/2024. He was then referred to the ED for further work up and treatment. Of note, he reports a history of blood clot in his right leg, so a duplex ultrasound was completed 11/22 which was negative for blood clots. Since the onset of his symptoms, he has been having fevers as high as 101. He received a 1l IVF bolus in the ED and has been started on IV Ancef.   *WBC: 13.5 on admission, down to 11.2  *Lactic acid:1.0  -Blood cultures with staph epiderdimis  -Started on IV Ancef on admission, switched to clindamycin as blood cultures growing gram-positive cocci in clusters; cannot rule out MRSA at this time.  - Switch abx back to ceftriaxone from clinda as patients pain has worsened.   -repeat blood cultures 11/27 NGTD  - CT of right lower leg with subcutaneous edema, no abscess or osteo noted.   - ID consult : input appreciated. Cont ceftriaxone and add clinda  - DVT was reportedly ruled out at urgent care, prior to admission.         Diet: Regular Diet Adult    DVT Prophylaxis: Pneumatic Compression Devices  Martinez Catheter: Not present  Lines: None     Cardiac Monitoring: None  Code Status: Full Code      Clinically Significant Risk Factors         # Hyponatremia: Lowest Na = 134 mmol/L in last 2 days, will monitor as appropriate                      # Severe Obesity: Estimated body mass  "index is 53.09 kg/m  as calculated from the following:    Height as of this encounter: 1.778 m (5' 10\").    Weight as of this encounter: 167.8 kg (370 lb)., PRESENT ON ADMISSION            Social Drivers of Health    Tobacco Use: Medium Risk (11/22/2024)    Patient History     Smoking Tobacco Use: Former     Smokeless Tobacco Use: Never          Disposition Plan     Medically Ready for Discharge: Anticipated in 2-4 Days             Marla Phelan MD  Hospitalist Service  [facility]  Securely message with [employer] (more info)  Text page via [employer] Paging/Directory   ______________________________________________________________________    Interval History   Continues to have slow improvement  Diffuse Erythema on right leg.     Physical Exam   Vital Signs: Temp: 98.9  F (37.2  C) Temp src: Oral BP: 139/69 Pulse: 94   Resp: 20 SpO2: 96 % O2 Device: None (Room air)    Weight: 370 lbs 0 oz    General Appearance: Well appearing for stated age.  Respiratory: CTAB, no rales or ronchi  Cardiovascular: S1, S2 normal, no murmurs  GI: non-tender on palpation, BS present      Medical Decision Making       55 MINUTES SPENT BY ME on the date of service doing chart review, history, exam, documentation & further activities per the note.      Data     I have personally reviewed the following data over the past 24 hrs:    12.2 (H)  \   12.8 (L)   / 363     134 (L) 98 8.4 /  118 (H)   4.2 26 0.59 (L) \     Procal: N/A CRP: 147.52 (H) Lactic Acid: N/A         Imaging results reviewed over the past 24 hrs:   Recent Results (from the past 24 hours)   CT Tibia Fibula Lower Leg Right w Contrast    Narrative    EXAM: CT TIBIA FIBULA LOWER LEG RIGHT W CONTRAST  LOCATION: [facility]  DATE: 11/28/2024    INDICATION: worsening cellulitis  COMPARISON: None available time of dictation..  TECHNIQUE: IV contrast. Axial, sagittal and coronal thin-section reconstruction. Dose reduction techniques were " used.   CONTRAST: 90ml isovue 370    FINDINGS:     BONES:  -No acute fracture or dislocation.  -No CT evidence of osteomyelitis.  -Mild degenerative changes of the knee.    SOFT TISSUES:  -Diffuse subcutaneous edema and skin thickening throughout the lower extremity, favored represent cellulitis given the clinical scenario.   -No discrete rim-enhancing fluid collection to suggest abscess.  -Prominent varicosities throughout the lower extremity.        Impression    IMPRESSION:  1.  Diffuse subcutaneous edema and skin thickening throughout the lower extremity, favored represent cellulitis given the clinical scenario.   2.  No evidence of abscess or CT evidence of osteomyelitis.  3.  Prominent varicosities throughout the lower extremity.

## 2024-11-29 NOTE — PROVIDER NOTIFICATION
MD Notification    Notified Person: MD Keating    Notification Date/Time:    Notification Interaction:  vocera  Purpose of Notification:  Hi pt is in pain and is requesting pain medications. pt recieved toradol x1 yesterday which helped he said and was wondering if he can have that ordered again.    Orders Received:    Comments:

## 2024-11-29 NOTE — CONSULTS
[facility]    Infectious Disease Consultation     Date of Admission:  11/26/2024  Date of Consult (When I saw the patient): 11/29/24    Assessment & Plan   Se Robins is a 39 year old male who was admitted on 11/26/2024.     Impression:  38 yo admitted with right  leg cellulitis. He has a past medical history of lymphangioma and thrombophlebitis of superficial veins of left lower extremity.   On ceftriaxone   Blood cultures with staph epi, most likely a contaminant in this setting.   Repeat blood culture pending   Slow ot improve       Recommendations   Continue ceftriaxone   Start clinda   Keep the LE elevated at all times sitting and lying down   Lotion on the dry feet     Leslie Luong MD    Reason for Consult   Reason for consult: I was asked to evaluate this patient for left leg cellulitis .    Primary Care Physician   Physician No Ref-Primary    Chief Complaint   Right  LE cellulitis     History is obtained from the patient and medical records    History of Present Illness   Se Robins is a 39 year old male who presents with left leg pain and redness not improving on oral antibiotics     Past Medical History   I have reviewed this patient's medical history and updated it with pertinent information if needed.   Past Medical History:   Diagnosis Date    Anxiety     Lymphangioma     per pt this was the diagnosis of his skin below the axilla on the right    Major depression 1/6/2014    Thrombophlebitis of superficial veins of right lower extremity 8/14/2017    Greater saphenous vein.       Past Surgical History   I have reviewed this patient's surgical history and updated it with pertinent information if needed.  No past surgical history on file.    Prior to Admission Medications   Prior to Admission Medications   Prescriptions Last Dose Informant Patient Reported? Taking?   ammonium lactate (AMLACTIN) 12 % external cream More than a month  No Yes   Sig: Apply topically 2 times  daily.   Patient taking differently: Apply topically daily as needed for dry skin.   amoxicillin-clavulanate (AUGMENTIN) 875-125 MG tablet 11/26/2024 Morning  No Yes   Sig: Take 1 tablet by mouth 2 times daily for 10 days.   hydrocortisone 2.5 % cream Past Month  No Yes   Sig: MIX 1:1 WITH 2% Ketoconazole CREAM AND APPLY TO AFFECTED AREAS TWICE DAILY FOR UP TO 14 DAYS   Patient taking differently: Apply topically 2 times daily as needed for rash. MIX 1:1 WITH 2% Ketoconazole CREAM AND APPLY TO AFFECTED AREAS TWICE DAILY FOR UP TO 14 DAYS   ketoconazole (NIZORAL) 2 % external cream Past Month  No Yes   Sig: MIX 1:1 WITH 2.5 % HYDROCORTISONE CREAM AND APPLY TO AFFECTED AREAS TWICE DAILY FOR UP TO 14 DAYS   Patient taking differently: Apply topically 2 times daily as needed for irritation. MIX 1:1 WITH 2.5 % HYDROCORTISONE CREAM AND APPLY TO AFFECTED AREAS TWICE DAILY FOR UP TO 14 DAYS   ketoconazole (NIZORAL) 2 % external shampoo   No No   Sig: Massage into wet scalp, let sit 3-5 min, then rinse. Do this 3x weekly.      Facility-Administered Medications: None     Allergies   No Known Allergies    Immunization History   Immunization History   Administered Date(s) Administered    Historical DTP/aP 1983, 1983, 1983    OPV, trivalent, live 1983, 1983       Social History   I have reviewed this patient's social history and updated it with pertinent information if needed. Se SMITH Shimon  reports that he has quit smoking. His smoking use included cigarettes. He has never used smokeless tobacco. He reports current alcohol use. He reports current drug use. Drug: Marijuana.    Family History   I have reviewed this patient's family history and updated it with pertinent information if needed.   Family History   Problem Relation Age of Onset    Arthritis Mother     Depression Mother     Cerebrovascular Disease Mother     Alcohol/Drug Father     Depression Father     Substance Abuse Father      "Alcohol/Drug Brother     Depression Brother     Schizophrenia Brother     Bipolar Disorder Brother     Substance Abuse Brother     Schizophrenia Brother     Bipolar Disorder Brother        Review of Systems   The 10 point Review of Systems is negative    Physical Exam   Temp: 98.9  F (37.2  C) Temp src: Oral BP: 127/81 Pulse: 104   Resp: 18 SpO2: 92 % O2 Device: None (Room air)    Vital Signs with Ranges  Temp:  [97.8  F (36.6  C)-100  F (37.8  C)] 98.9  F (37.2  C)  Pulse:  [] 104  Resp:  [16-18] 18  BP: (108-147)/(68-84) 127/81  SpO2:  [92 %-97 %] 92 %  370 lbs 0 oz  Body mass index is 53.09 kg/m .    GENERAL APPEARANCE:  awake  EYES: Eyes grossly normal to inspection  NECK: no adenopathy  RESP: lungs clear   CV: regular rates and rhythm  LYMPHATICS: normal ant/post cervical and supraclavicular nodes  ABDOMEN: soft, nontender  MS: right leg cellulitis   Very dry skin   SKIN: no suspicious lesions or rashes        Data   All laboratory and imaging data in the past 24 hours reviewed  No results for input(s): \"CULT\" in the last 168 hours.  No lab results found.    Invalid input(s): \"UC\"       All cultures:  Recent Labs   Lab 11/27/24  1521 11/26/24  1832   CULTURE No growth after 1 day Positive on the 1st day of incubation*  Staphylococcus epidermidis*      Blood culture:  Results for orders placed or performed during the hospital encounter of 11/26/24   Blood Culture Arm, Left    Collection Time: 11/27/24  3:21 PM    Specimen: Arm, Left; Blood   Result Value Ref Range    Culture No growth after 1 day    Blood Culture Peripheral Blood    Collection Time: 11/26/24  6:32 PM    Specimen: Peripheral Blood   Result Value Ref Range    Culture Positive on the 1st day of incubation (A)     Culture Staphylococcus epidermidis (AA)        Susceptibility    Staphylococcus epidermidis - KAM*     Oxacillin* <=0.25 Susceptible ug/mL      * Oxacillin susceptible isolates are susceptible to cephalosporins (example: cefazolin " "and cephalexin) and beta lactam combination agents. Oxacillin resistant isolates are resistant to these agents.     Gentamicin <=0.5 Susceptible ug/mL     Ciprofloxacin <=0.5 Susceptible ug/mL     Levofloxacin <=0.12 Susceptible ug/mL     Erythromycin <=0.25 Susceptible ug/mL     Clindamycin 0.25 Susceptible ug/mL     Vancomycin 2 Susceptible ug/mL     Daptomycin 0.25 Susceptible ug/mL     Tetracycline <=1 Susceptible ug/mL     Doxycycline 1 Susceptible ug/mL     * Antibiotics listed as \"No Interpretation\" have no regulatory guidelines for susceptibility/resistance available.      Urine culture:  No results found for this or any previous visit.          "

## 2024-11-30 LAB
BASOPHILS # BLD AUTO: 0.1 10E3/UL (ref 0–0.2)
BASOPHILS NFR BLD AUTO: 1 %
CRP SERPL-MCNC: 171.34 MG/L
EOSINOPHIL # BLD AUTO: 0.2 10E3/UL (ref 0–0.7)
EOSINOPHIL NFR BLD AUTO: 1 %
ERYTHROCYTE [DISTWIDTH] IN BLOOD BY AUTOMATED COUNT: 13.3 % (ref 10–15)
HCT VFR BLD AUTO: 38.8 % (ref 40–53)
HGB BLD-MCNC: 12.8 G/DL (ref 13.3–17.7)
IMM GRANULOCYTES # BLD: 0.1 10E3/UL
IMM GRANULOCYTES NFR BLD: 1 %
LYMPHOCYTES # BLD AUTO: 1.5 10E3/UL (ref 0.8–5.3)
LYMPHOCYTES NFR BLD AUTO: 13 %
MCH RBC QN AUTO: 28.7 PG (ref 26.5–33)
MCHC RBC AUTO-ENTMCNC: 33 G/DL (ref 31.5–36.5)
MCV RBC AUTO: 87 FL (ref 78–100)
MONOCYTES # BLD AUTO: 0.7 10E3/UL (ref 0–1.3)
MONOCYTES NFR BLD AUTO: 6 %
NEUTROPHILS # BLD AUTO: 9.4 10E3/UL (ref 1.6–8.3)
NEUTROPHILS NFR BLD AUTO: 78 %
NRBC # BLD AUTO: 0 10E3/UL
NRBC BLD AUTO-RTO: 0 /100
PLATELET # BLD AUTO: 401 10E3/UL (ref 150–450)
RBC # BLD AUTO: 4.46 10E6/UL (ref 4.4–5.9)
WBC # BLD AUTO: 12 10E3/UL (ref 4–11)

## 2024-11-30 PROCEDURE — 86140 C-REACTIVE PROTEIN: CPT | Performed by: HOSPITALIST

## 2024-11-30 PROCEDURE — 250N000011 HC RX IP 250 OP 636: Performed by: HOSPITALIST

## 2024-11-30 PROCEDURE — 36415 COLL VENOUS BLD VENIPUNCTURE: CPT | Performed by: HOSPITALIST

## 2024-11-30 PROCEDURE — 85049 AUTOMATED PLATELET COUNT: CPT | Performed by: HOSPITALIST

## 2024-11-30 PROCEDURE — 250N000013 HC RX MED GY IP 250 OP 250 PS 637: Performed by: HOSPITALIST

## 2024-11-30 PROCEDURE — 120N000001 HC R&B MED SURG/OB

## 2024-11-30 PROCEDURE — 85004 AUTOMATED DIFF WBC COUNT: CPT | Performed by: HOSPITALIST

## 2024-11-30 PROCEDURE — 250N000011 HC RX IP 250 OP 636: Performed by: INTERNAL MEDICINE

## 2024-11-30 PROCEDURE — 250N000013 HC RX MED GY IP 250 OP 250 PS 637

## 2024-11-30 PROCEDURE — 99232 SBSQ HOSP IP/OBS MODERATE 35: CPT | Performed by: HOSPITALIST

## 2024-11-30 RX ORDER — KETOROLAC TROMETHAMINE 15 MG/ML
15 INJECTION, SOLUTION INTRAMUSCULAR; INTRAVENOUS EVERY 6 HOURS PRN
Status: ACTIVE | OUTPATIENT
Start: 2024-11-30 | End: 2024-12-05

## 2024-11-30 RX ADMIN — ACETAMINOPHEN 650 MG: 325 TABLET, FILM COATED ORAL at 07:55

## 2024-11-30 RX ADMIN — ACETAMINOPHEN 650 MG: 325 TABLET, FILM COATED ORAL at 18:42

## 2024-11-30 RX ADMIN — CEFTRIAXONE SODIUM 2 G: 2 INJECTION, POWDER, FOR SOLUTION INTRAMUSCULAR; INTRAVENOUS at 15:31

## 2024-11-30 RX ADMIN — CLINDAMYCIN PHOSPHATE 900 MG: 900 INJECTION, SOLUTION INTRAVENOUS at 20:40

## 2024-11-30 RX ADMIN — IBUPROFEN 400 MG: 400 TABLET, FILM COATED ORAL at 07:55

## 2024-11-30 RX ADMIN — CLINDAMYCIN PHOSPHATE 900 MG: 900 INJECTION, SOLUTION INTRAVENOUS at 11:36

## 2024-11-30 RX ADMIN — CLINDAMYCIN PHOSPHATE 900 MG: 900 INJECTION, SOLUTION INTRAVENOUS at 03:24

## 2024-11-30 RX ADMIN — IBUPROFEN 400 MG: 400 TABLET, FILM COATED ORAL at 18:42

## 2024-11-30 ASSESSMENT — ACTIVITIES OF DAILY LIVING (ADL)
ADLS_ACUITY_SCORE: 37
ADLS_ACUITY_SCORE: 22
ADLS_ACUITY_SCORE: 37
ADLS_ACUITY_SCORE: 33
ADLS_ACUITY_SCORE: 22
ADLS_ACUITY_SCORE: 33
ADLS_ACUITY_SCORE: 22
ADLS_ACUITY_SCORE: 33
ADLS_ACUITY_SCORE: 22
ADLS_ACUITY_SCORE: 33
ADLS_ACUITY_SCORE: 37
ADLS_ACUITY_SCORE: 22
ADLS_ACUITY_SCORE: 22
ADLS_ACUITY_SCORE: 37
ADLS_ACUITY_SCORE: 22
ADLS_ACUITY_SCORE: 22
ADLS_ACUITY_SCORE: 33
ADLS_ACUITY_SCORE: 37
ADLS_ACUITY_SCORE: 33

## 2024-11-30 NOTE — PROGRESS NOTES
Shift Summary [id number]    Admitting Diagnosis: Cellulitis of right leg [L03.115]   Vitals stable on RA  Pain Denies  A&Ox4  Voiding to BR  Mobility Ind  Tele N/A  CMS Intact  Lung Sounds Clear on RA   GI WNL  Dressing N/A    Orders Placed This Encounter      Regular Diet Adult       Plan:  Pending safe deposition plan.

## 2024-11-30 NOTE — PLAN OF CARE
[id number]    Reason for Admission: RLE pain/RLE cellulitis    Cognitive/Mentation: A/Ox 4  Neuros/CMS: Intact ex generalized weakness in BLE. RLE 3+/LLE 2+ edema. RLE tender to touch, red in color and warm to touch. Elevation encouraged.  VS: VSS.   /GI: Continent.   Pulmonary: LS clear.  Pain: Reported generalized headache/neckache this AM. Resolved with PRN Ibuprofen. Declined rest of day, but mentioned need for Toradol last two evenings. Request sent to MD.     Drains/Lines: PIV SL  Activity: Up independently   Diet: Regular with thin liquids. Takes pills whole with water.     Discharge: pending ID recommendations and ABX completion    Aggression Stoplight Tool: Green    End of shift summary: New abx started today. WBC more elevated today. ID following.

## 2024-11-30 NOTE — PROGRESS NOTES
[facility]    Medicine Progress Note - Hospitalist Service    Date of Admission:  11/26/2024    Assessment & Plan      Se Robins is a 39 year old male admitted on 11/26/2024 for left leg cellulitis. He has a past medical history of lymphangioma and thrombophlebitis of superficial veins of right lower extremity.      Right leg cellulitis  Staph epi Bacteremia  Mr. Robins presents to the ED for evaluation of right lower extremity cellulitis. He was recently seen in urgent care on 11/22/24 for this and was prescribed Augmentin. His symptoms failed to improve, prompting him to return to urgent care on 11/26/2024. He was then referred to the ED for further work up and treatment. Of note, he reports a history of blood clot in his right leg, so a duplex ultrasound was completed 11/22 which was negative for blood clots. Since the onset of his symptoms, he has been having fevers as high as 101. He received a 1l IVF bolus in the ED and has been started on IV Ancef.   *WBC: 13.5 on admission, down to 11.2  *Lactic acid:1.0  -Blood cultures with staph epiderdimis  -Started on IV Ancef on admission, switched to clindamycin as blood cultures growing gram-positive cocci in clusters; cannot rule out MRSA at this time.  - Switch abx back to ceftriaxone from clinda as patients pain has worsened.   -repeat blood cultures 11/27 NGTD  - CT of right lower leg with subcutaneous edema, no abscess or osteo noted.   - ID consult : input appreciated. Cont ceftriaxone and clinda added on  - DVT was reportedly ruled out at urgent care, prior to admission.         Diet: Regular Diet Adult    DVT Prophylaxis: Pneumatic Compression Devices  Martinez Catheter: Not present  Lines: None     Cardiac Monitoring: None  Code Status: Full Code      Clinically Significant Risk Factors         # Hyponatremia: Lowest Na = 134 mmol/L in last 2 days, will monitor as appropriate                      # Severe Obesity: Estimated body  "mass index is 53.09 kg/m  as calculated from the following:    Height as of this encounter: 1.778 m (5' 10\").    Weight as of this encounter: 167.8 kg (370 lb)., PRESENT ON ADMISSION            Social Drivers of Health    Tobacco Use: Medium Risk (11/22/2024)    Patient History     Smoking Tobacco Use: Former     Smokeless Tobacco Use: Never          Disposition Plan     Medically Ready for Discharge: Anticipated in 2-4 Days             Marla Phelan MD  Hospitalist Service  [facility]  Securely message with [employer] (more info)  Text page via [employer] Paging/Directory   ______________________________________________________________________    Interval History   Some clinical improvement in right lower extremity.  Decreased swelling and decreased pain noted as well.  Also decreased warmth, decreased tenderness.    Physical Exam   Vital Signs: Temp: 97.6  F (36.4  C) Temp src: Oral BP: 126/79 Pulse: 92   Resp: 18 SpO2: 95 % O2 Device: None (Room air)    Weight: 370 lbs 0 oz    General Appearance: Well appearing for stated age.  Respiratory: CTAB, no rales or ronchi  Cardiovascular: S1, S2 normal, no murmurs  GI: non-tender on palpation, BS present      Medical Decision Making       55 MINUTES SPENT BY ME on the date of service doing chart review, history, exam, documentation & further activities per the note.      Data     I have personally reviewed the following data over the past 24 hrs:    12.0 (H)  \   12.8 (L)   / 401     N/A N/A N/A /  N/A   N/A N/A N/A \     Procal: N/A CRP: 171.34 (H) Lactic Acid: N/A         Imaging results reviewed over the past 24 hrs:   Recent Results (from the past 24 hours)   CT Head w/o Contrast    Narrative    EXAM: CT HEAD W/O CONTRAST  LOCATION: [facility]  DATE: 11/29/2024    INDICATION: Headaches. Headache. Existing HA disorder with clinical progression. No known automatically detected potential contraindications to MRI.    COMPARISON: " None.    TECHNIQUE: Routine CT head without intravenous contrast. Multiplanar reformats. Dose reduction techniques were used.    FINDINGS:  INTRACRANIAL CONTENTS: No intracranial hemorrhage, extra-axial collection, or mass effect. No CT evidence of acute infarct. Normal parenchymal attenuation. Normal ventricles and sulci.     VISUALIZED ORBITS/SINUSES/MASTOIDS: No intraorbital abnormality. No paranasal sinus mucosal disease. No middle ear or mastoid effusion.    BONES/SOFT TISSUES: No acute abnormality.      Impression    IMPRESSION: Normal head CT.

## 2024-11-30 NOTE — PROVIDER NOTIFICATION
"MD Notification    Notified Person: MD    Notified Person Name: Zuri Chaparro    Notification Date/Time: today at 8pm    Notification Interaction: [employer] messaging     Purpose of Notification:    Orders Received: none, awaiting     Comments: RN stated, \"pt requesting PRN Toradol. He has had one time doses the last few nights, wondering if you can just place a PRN order.\"    "

## 2024-11-30 NOTE — PLAN OF CARE
Goal Outcome Evaluation:    Shift Summary [id number]    Admitting Diagnosis: Cellulitis of right leg [L03.115]   Vitals - stable on RA, tachy   Pain - managed with PRN ibuprofen   A&Ox4  Voiding - up to bathroom   Mobility - indep   GI - passing gas  Dressing - n/a    Orders Placed This Encounter      Regular Diet Adult       Plan: Ordered blue wedge to help keep RLE elevated. Pt had CT scan done. Discharge pending.

## 2024-12-01 LAB
ANION GAP SERPL CALCULATED.3IONS-SCNC: 13 MMOL/L (ref 7–15)
BASOPHILS # BLD AUTO: 0.1 10E3/UL (ref 0–0.2)
BASOPHILS NFR BLD AUTO: 1 %
BUN SERPL-MCNC: 9.3 MG/DL (ref 6–20)
CALCIUM SERPL-MCNC: 8.8 MG/DL (ref 8.8–10.4)
CHLORIDE SERPL-SCNC: 97 MMOL/L (ref 98–107)
CREAT SERPL-MCNC: 0.62 MG/DL (ref 0.67–1.17)
CRP SERPL-MCNC: 167.45 MG/L
EGFRCR SERPLBLD CKD-EPI 2021: >90 ML/MIN/1.73M2
EOSINOPHIL # BLD AUTO: 0.3 10E3/UL (ref 0–0.7)
EOSINOPHIL NFR BLD AUTO: 3 %
ERYTHROCYTE [DISTWIDTH] IN BLOOD BY AUTOMATED COUNT: 13.3 % (ref 10–15)
GLUCOSE SERPL-MCNC: 128 MG/DL (ref 70–99)
HCO3 SERPL-SCNC: 24 MMOL/L (ref 22–29)
HCT VFR BLD AUTO: 37.8 % (ref 40–53)
HGB BLD-MCNC: 12.6 G/DL (ref 13.3–17.7)
IMM GRANULOCYTES # BLD: 0.1 10E3/UL
IMM GRANULOCYTES NFR BLD: 1 %
LYMPHOCYTES # BLD AUTO: 1.2 10E3/UL (ref 0.8–5.3)
LYMPHOCYTES NFR BLD AUTO: 11 %
MCH RBC QN AUTO: 28.9 PG (ref 26.5–33)
MCHC RBC AUTO-ENTMCNC: 33.3 G/DL (ref 31.5–36.5)
MCV RBC AUTO: 87 FL (ref 78–100)
MONOCYTES # BLD AUTO: 0.9 10E3/UL (ref 0–1.3)
MONOCYTES NFR BLD AUTO: 7 %
NEUTROPHILS # BLD AUTO: 9.1 10E3/UL (ref 1.6–8.3)
NEUTROPHILS NFR BLD AUTO: 78 %
NRBC # BLD AUTO: 0 10E3/UL
NRBC BLD AUTO-RTO: 0 /100
PLATELET # BLD AUTO: 390 10E3/UL (ref 150–450)
POTASSIUM SERPL-SCNC: 4.1 MMOL/L (ref 3.4–5.3)
RBC # BLD AUTO: 4.36 10E6/UL (ref 4.4–5.9)
SODIUM SERPL-SCNC: 134 MMOL/L (ref 135–145)
WBC # BLD AUTO: 11.6 10E3/UL (ref 4–11)

## 2024-12-01 PROCEDURE — 82565 ASSAY OF CREATININE: CPT | Performed by: HOSPITALIST

## 2024-12-01 PROCEDURE — 250N000011 HC RX IP 250 OP 636: Performed by: INTERNAL MEDICINE

## 2024-12-01 PROCEDURE — 120N000001 HC R&B MED SURG/OB

## 2024-12-01 PROCEDURE — 99232 SBSQ HOSP IP/OBS MODERATE 35: CPT | Performed by: HOSPITALIST

## 2024-12-01 PROCEDURE — 86140 C-REACTIVE PROTEIN: CPT | Performed by: HOSPITALIST

## 2024-12-01 PROCEDURE — 85004 AUTOMATED DIFF WBC COUNT: CPT | Performed by: HOSPITALIST

## 2024-12-01 PROCEDURE — 250N000013 HC RX MED GY IP 250 OP 250 PS 637: Performed by: HOSPITALIST

## 2024-12-01 PROCEDURE — 82435 ASSAY OF BLOOD CHLORIDE: CPT | Performed by: HOSPITALIST

## 2024-12-01 PROCEDURE — 80048 BASIC METABOLIC PNL TOTAL CA: CPT | Performed by: HOSPITALIST

## 2024-12-01 PROCEDURE — 250N000013 HC RX MED GY IP 250 OP 250 PS 637

## 2024-12-01 PROCEDURE — 36415 COLL VENOUS BLD VENIPUNCTURE: CPT | Performed by: HOSPITALIST

## 2024-12-01 PROCEDURE — 999N000128 HC STATISTIC PERIPHERAL IV START W/O US GUIDANCE

## 2024-12-01 RX ORDER — CEFEPIME HYDROCHLORIDE 2 G/1
2 INJECTION, POWDER, FOR SOLUTION INTRAVENOUS EVERY 24 HOURS
Status: DISCONTINUED | OUTPATIENT
Start: 2024-12-01 | End: 2024-12-01

## 2024-12-01 RX ORDER — CEFTRIAXONE 2 G/1
2 INJECTION, POWDER, FOR SOLUTION INTRAMUSCULAR; INTRAVENOUS EVERY 24 HOURS
Status: DISCONTINUED | OUTPATIENT
Start: 2024-12-01 | End: 2024-12-05 | Stop reason: HOSPADM

## 2024-12-01 RX ORDER — CEFEPIME HYDROCHLORIDE 2 G/1
2 INJECTION, POWDER, FOR SOLUTION INTRAVENOUS EVERY 12 HOURS
Status: DISCONTINUED | OUTPATIENT
Start: 2024-12-01 | End: 2024-12-01

## 2024-12-01 RX ADMIN — IBUPROFEN 400 MG: 400 TABLET, FILM COATED ORAL at 22:53

## 2024-12-01 RX ADMIN — ACETAMINOPHEN 650 MG: 325 TABLET, FILM COATED ORAL at 10:03

## 2024-12-01 RX ADMIN — CLINDAMYCIN PHOSPHATE 900 MG: 900 INJECTION, SOLUTION INTRAVENOUS at 03:46

## 2024-12-01 RX ADMIN — IBUPROFEN 400 MG: 400 TABLET, FILM COATED ORAL at 02:53

## 2024-12-01 RX ADMIN — ACETAMINOPHEN 650 MG: 325 TABLET, FILM COATED ORAL at 16:33

## 2024-12-01 RX ADMIN — CLINDAMYCIN PHOSPHATE 900 MG: 900 INJECTION, SOLUTION INTRAVENOUS at 11:46

## 2024-12-01 RX ADMIN — CEFTRIAXONE SODIUM 2 G: 2 INJECTION, POWDER, FOR SOLUTION INTRAMUSCULAR; INTRAVENOUS at 14:27

## 2024-12-01 RX ADMIN — ACETAMINOPHEN 650 MG: 325 TABLET, FILM COATED ORAL at 02:53

## 2024-12-01 RX ADMIN — ACETAMINOPHEN 650 MG: 325 TABLET, FILM COATED ORAL at 22:53

## 2024-12-01 RX ADMIN — IBUPROFEN 400 MG: 400 TABLET, FILM COATED ORAL at 16:33

## 2024-12-01 RX ADMIN — IBUPROFEN 400 MG: 400 TABLET, FILM COATED ORAL at 10:04

## 2024-12-01 ASSESSMENT — ACTIVITIES OF DAILY LIVING (ADL)
ADLS_ACUITY_SCORE: 36
ADLS_ACUITY_SCORE: 36
ADLS_ACUITY_SCORE: 37
ADLS_ACUITY_SCORE: 36
ADLS_ACUITY_SCORE: 37
ADLS_ACUITY_SCORE: 37
ADLS_ACUITY_SCORE: 36
ADLS_ACUITY_SCORE: 37
ADLS_ACUITY_SCORE: 37
ADLS_ACUITY_SCORE: 36
ADLS_ACUITY_SCORE: 37
ADLS_ACUITY_SCORE: 36
ADLS_ACUITY_SCORE: 37
ADLS_ACUITY_SCORE: 36
ADLS_ACUITY_SCORE: 36

## 2024-12-01 NOTE — PROGRESS NOTES
DATE & TIME:11/31/24, 9586-0130  Cognitive Concerns/ Orientation:A/Ox4  BEHAVIOR & AGGRESSION TOOL COLOR:Green, calm and cooperative  ABNL VS/O2:VSS on RA ex tachy  MOBILITY:Independent  PAIN MANAGMENT:Given Tylenol and Ibuprofen  DIET:Regular  BOWEL/BLADDER:Continent of B/B  DRAIN/DEVICES:PIV SL  TELEMETRY RHYTHM: N/A  SKIN: Cellulitis RLE  TESTS/PROCEDURES:none  D/C DATE: Discharge pending safe deposition

## 2024-12-01 NOTE — PROGRESS NOTES
[facility]  Infectious Disease Progress    Date of Admission:  11/26/2024  Date of Consult (When I saw the patient): 12/1/24    Assessment & Plan   Se Robins is a 39 year old male who was admitted on 11/26/2024.     Today 12/1/2024  Feels the leg is a little better  No fever or chills  Less pain  No n/v/d or rashes    Impression:  40 yo admitted with right  leg cellulitis. He has a past medical history of lymphangioma and thrombophlebitis of superficial veins of left lower extremity.   On cefepime now?  I agree blood cultures with staph epi is  a contaminant in this setting.   Repeat blood culture neg  Slow ot improve common with cellulitis, dependent edema      Recommendations   Stopping the cefepime and clinda  Starting iv ceftriaxone 2 q g 24 hrs  Keep the LE elevated at all times sitting and lying down   Lotion on the dry feet     Tommy Ndiaye MD    Current Facility-Administered Medications   Medication Dose Route Frequency Provider Last Rate Last Admin    acetaminophen (TYLENOL) tablet 650 mg  650 mg Oral Q4H PRN Champ Ramirez NP   650 mg at 12/01/24 1003    Or    acetaminophen (TYLENOL) Suppository 650 mg  650 mg Rectal Q4H PRN Champ Ramirez NP        ceFEPIme (MAXIPIME) 2 g vial to attach to  mL bag for ADULTS or NS 50 mL bag for PEDS  2 g Intravenous Q24H Shahabu-Marla Land MD        clindamycin (CLEOCIN) 900 mg in 50 mL D5W intermittent infusion  900 mg Intravenous Q8H Leslie Luong  mL/hr at 12/01/24 1146 900 mg at 12/01/24 1146    hydrALAZINE (APRESOLINE) tablet 10 mg  10 mg Oral Q4H PRN Champ Ramirez NP        Or    hydrALAZINE (APRESOLINE) injection 10 mg  10 mg Intravenous Q4H PRN Champ Ramirez NP        ibuprofen (ADVIL/MOTRIN) tablet 400 mg  400 mg Oral Q6H PRN Tapan-Marla Land MD   400 mg at 12/01/24 1004    ketorolac (TORADOL) injection 15 mg  15 mg Intravenous Q6H PRN Pradeep  "MD Marla        ondansetron (ZOFRAN ODT) ODT tab 4 mg  4 mg Oral Q6H PRN Champ Ramirez NP        Or    ondansetron (ZOFRAN) injection 4 mg  4 mg Intravenous Q6H PRN Champ Ramirez NP   4 mg at 11/28/24 1730    senna-docusate (SENOKOT-S/PERICOLACE) 8.6-50 MG per tablet 1 tablet  1 tablet Oral BID PRN Champ Ramirez NP        Or    senna-docusate (SENOKOT-S/PERICOLACE) 8.6-50 MG per tablet 2 tablet  2 tablet Oral BID PRN Champ Ramirez NP             Physical Exam   Temp: 97.4  F (36.3  C) Temp src: Oral BP: 112/74 Pulse: 94   Resp: 16 SpO2: 92 % O2 Device: None (Room air)    Vital Signs with Ranges  Temp:  [97.4  F (36.3  C)-100.1  F (37.8  C)] 97.4  F (36.3  C)  Pulse:  [] 94  Resp:  [16-18] 16  BP: (112-144)/(70-96) 112/74  SpO2:  [92 %-97 %] 92 %  370 lbs 0 oz  Body mass index is 53.09 kg/m .  GENERAL APPEARANCE:  awake  EYES: Eyes grossly normal to inspection  NECK: no adenopathy  RESP: lungs clear   CV: regular rates and rhythm  LYMPHATICS: normal ant/post cervical and supraclavicular nodes  ABDOMEN: soft, nontender  MS: right leg erythema mod and 2+ edema, mild tenderness  Very dry skin   SKIN: no suspicious lesions or rashes        Data   All laboratory and imaging data in the past 24 hours reviewed  No results for input(s): \"CULT\" in the last 168 hours.  No lab results found.    Invalid input(s): \"UC\"       All cultures:  Recent Labs   Lab 11/27/24  1521 11/26/24  1832   CULTURE No growth after 3 days Positive on the 1st day of incubation*  Staphylococcus epidermidis*      Blood culture:  Results for orders placed or performed during the hospital encounter of 11/26/24   Blood Culture Arm, Left    Collection Time: 11/27/24  3:21 PM    Specimen: Arm, Left; Blood   Result Value Ref Range    Culture No growth after 3 days    Blood Culture Peripheral Blood    Collection Time: 11/26/24  6:32 PM    Specimen: Peripheral Blood   Result Value Ref Range " "   Culture Positive on the 1st day of incubation (A)     Culture Staphylococcus epidermidis (AA)        Susceptibility    Staphylococcus epidermidis - KAM*     Oxacillin* <=0.25 Susceptible ug/mL      * Oxacillin susceptible isolates are susceptible to cephalosporins (example: cefazolin and cephalexin) and beta lactam combination agents. Oxacillin resistant isolates are resistant to these agents.     Gentamicin <=0.5 Susceptible ug/mL     Ciprofloxacin <=0.5 Susceptible ug/mL     Levofloxacin <=0.12 Susceptible ug/mL     Erythromycin <=0.25 Susceptible ug/mL     Clindamycin 0.25 Susceptible ug/mL     Vancomycin 2 Susceptible ug/mL     Daptomycin 0.25 Susceptible ug/mL     Tetracycline <=1 Susceptible ug/mL     Doxycycline 1 Susceptible ug/mL     * Antibiotics listed as \"No Interpretation\" have no regulatory guidelines for susceptibility/resistance available.      Urine culture:  No results found for this or any previous visit.          "

## 2024-12-01 NOTE — PROGRESS NOTES
[facility]    Medicine Progress Note - Hospitalist Service    Date of Admission:  11/26/2024    Assessment & Plan      Se Robins is a 39 year old male admitted on 11/26/2024 for left leg cellulitis. He has a past medical history of lymphangioma and thrombophlebitis of superficial veins of right lower extremity.     Hospital course has been complicated by slow to improve cellulitis.  Now with involvement of left lower extremity.  Per ID, this is slow to improve cellulitis with no other process going on.     Right leg cellulitis  Staph epi Bacteremia, likely contaminant  Mr. Robins presents to the ED for evaluation of right lower extremity cellulitis. He was recently seen in urgent care on 11/22/24 for this and was prescribed Augmentin. His symptoms failed to improve, prompting him to return to urgent care on 11/26/2024. He was then referred to the ED for further work up and treatment. Of note, he reports a history of blood clot in his right leg, so a duplex ultrasound was completed 11/22 which was negative for blood clots. Since the onset of his symptoms, he has been having fevers as high as 101. He received a 1l IVF bolus in the ED and has been started on IV Ancef.   *WBC: 13.5 on admission, down to 11.2  *Lactic acid:1.0  -Blood cultures with staph epiderdimis  -Started on IV Ancef on admission, switched to clindamycin as blood cultures growing gram-positive cocci in clusters; cannot rule out MRSA at this time.  - Switch abx back to ceftriaxone from clinda as patients pain has worsened.   -repeat blood cultures 11/27 NGTD  - CT of right lower leg with subcutaneous edema, no abscess or osteo noted.   - ID consult : input appreciated. Cont ceftriaxone and clinda added on for double coverage.  Now clindamycin discontinued on 12/1.  - DVT was reportedly ruled out at urgent care, prior to admission.         Diet: Regular Diet Adult    DVT Prophylaxis: Pneumatic Compression Devices  Martinez  "Catheter: Not present  Lines: None     Cardiac Monitoring: None  Code Status: Full Code      Clinically Significant Risk Factors         # Hyponatremia: Lowest Na = 134 mmol/L in last 2 days, will monitor as appropriate  # Hypochloremia: Lowest Cl = 97 mmol/L in last 2 days, will monitor as appropriate                     # Severe Obesity: Estimated body mass index is 53.09 kg/m  as calculated from the following:    Height as of this encounter: 1.778 m (5' 10\").    Weight as of this encounter: 167.8 kg (370 lb)., PRESENT ON ADMISSION            Social Drivers of Health    Tobacco Use: Medium Risk (11/22/2024)    Patient History     Smoking Tobacco Use: Former     Smokeless Tobacco Use: Never          Disposition Plan     Medically Ready for Discharge: Anticipated in 2-4 Days    Disposition: May discharge once clinically improved.           Marla Phelan MD  Hospitalist Service  [facility]  Securely message with [employer] (more info)  Text page via [employer] Paging/Directory   ______________________________________________________________________    Interval History   Patient continues to have spread of erythema along the entire right lower extremity, still with some tenderness but somewhat improved.  Now with involvement of the left lower extremity.  Family concerned that this is quite slow to improve.    Physical Exam   Vital Signs: Temp: 97.4  F (36.3  C) Temp src: Oral BP: 112/74 Pulse: 94   Resp: 16 SpO2: 92 % O2 Device: None (Room air)    Weight: 370 lbs 0 oz    General Appearance: Well appearing for stated age.  Respiratory: CTAB, no rales or ronchi  Cardiovascular: S1, S2 normal, no murmurs  GI: non-tender on palpation, BS present      Medical Decision Making       55 MINUTES SPENT BY ME on the date of service doing chart review, history, exam, documentation & further activities per the note.      Data     I have personally reviewed the following data over the past 24 hrs:    11.6 (H)  \   " 12.6 (L)   / 390     134 (L) 97 (L) 9.3 /  128 (H)   4.1 24 0.62 (L) \     Procal: N/A CRP: 167.45 (H) Lactic Acid: N/A         Imaging results reviewed over the past 24 hrs:   No results found for this or any previous visit (from the past 24 hours).

## 2024-12-01 NOTE — PLAN OF CARE
Shift: [id number] 11/30/24    Medical  Diagnosis:R leg Cellulitis, Bacteremia  Mental Status: A/Ox4  Activity/dangle: Independent in room  Diet: Regular, good appetite  Pain:Pain is managed by PRN Tylenol and Ibuprofen  Martinez/Voiding: Voiding well per BR  Tele/Restraints/Iso: None  02/LDA: RA  D/C Date:Plan pending  Other Info:    VSS except low grade temp of 99.99-99.6 this afternoon. Noted having chills this evening and c/o pain, gave PRN Tylenol and Ibuprofen per pt request as both meds works well for his pain. Chills resolved after the above meds are given. Pt reported that his having chills every evening and it's not new for him. He is on IV antibiotic, ID is following.  He also reported that he has  diarrhea X1 today.

## 2024-12-02 LAB
ANION GAP SERPL CALCULATED.3IONS-SCNC: 14 MMOL/L (ref 7–15)
BACTERIA BLD CULT: NO GROWTH
BUN SERPL-MCNC: 9.8 MG/DL (ref 6–20)
CALCIUM SERPL-MCNC: 8.9 MG/DL (ref 8.8–10.4)
CHLORIDE SERPL-SCNC: 98 MMOL/L (ref 98–107)
CREAT SERPL-MCNC: 0.64 MG/DL (ref 0.67–1.17)
CRP SERPL-MCNC: 155.94 MG/L
EGFRCR SERPLBLD CKD-EPI 2021: >90 ML/MIN/1.73M2
ERYTHROCYTE [DISTWIDTH] IN BLOOD BY AUTOMATED COUNT: 13.3 % (ref 10–15)
GLUCOSE SERPL-MCNC: 128 MG/DL (ref 70–99)
HCO3 SERPL-SCNC: 23 MMOL/L (ref 22–29)
HCT VFR BLD AUTO: 38.3 % (ref 40–53)
HGB BLD-MCNC: 13 G/DL (ref 13.3–17.7)
MCH RBC QN AUTO: 29.4 PG (ref 26.5–33)
MCHC RBC AUTO-ENTMCNC: 33.9 G/DL (ref 31.5–36.5)
MCV RBC AUTO: 87 FL (ref 78–100)
PLATELET # BLD AUTO: 425 10E3/UL (ref 150–450)
POTASSIUM SERPL-SCNC: 4.2 MMOL/L (ref 3.4–5.3)
RBC # BLD AUTO: 4.42 10E6/UL (ref 4.4–5.9)
SODIUM SERPL-SCNC: 135 MMOL/L (ref 135–145)
WBC # BLD AUTO: 11.7 10E3/UL (ref 4–11)

## 2024-12-02 PROCEDURE — 120N000001 HC R&B MED SURG/OB

## 2024-12-02 PROCEDURE — 99233 SBSQ HOSP IP/OBS HIGH 50: CPT | Performed by: STUDENT IN AN ORGANIZED HEALTH CARE EDUCATION/TRAINING PROGRAM

## 2024-12-02 PROCEDURE — 85014 HEMATOCRIT: CPT | Performed by: STUDENT IN AN ORGANIZED HEALTH CARE EDUCATION/TRAINING PROGRAM

## 2024-12-02 PROCEDURE — 250N000013 HC RX MED GY IP 250 OP 250 PS 637: Performed by: HOSPITALIST

## 2024-12-02 PROCEDURE — 85041 AUTOMATED RBC COUNT: CPT | Performed by: STUDENT IN AN ORGANIZED HEALTH CARE EDUCATION/TRAINING PROGRAM

## 2024-12-02 PROCEDURE — 82565 ASSAY OF CREATININE: CPT | Performed by: STUDENT IN AN ORGANIZED HEALTH CARE EDUCATION/TRAINING PROGRAM

## 2024-12-02 PROCEDURE — 250N000011 HC RX IP 250 OP 636: Performed by: INTERNAL MEDICINE

## 2024-12-02 PROCEDURE — 86140 C-REACTIVE PROTEIN: CPT | Performed by: STUDENT IN AN ORGANIZED HEALTH CARE EDUCATION/TRAINING PROGRAM

## 2024-12-02 PROCEDURE — 250N000013 HC RX MED GY IP 250 OP 250 PS 637

## 2024-12-02 PROCEDURE — 80048 BASIC METABOLIC PNL TOTAL CA: CPT | Performed by: STUDENT IN AN ORGANIZED HEALTH CARE EDUCATION/TRAINING PROGRAM

## 2024-12-02 PROCEDURE — 99232 SBSQ HOSP IP/OBS MODERATE 35: CPT | Performed by: INTERNAL MEDICINE

## 2024-12-02 PROCEDURE — 36415 COLL VENOUS BLD VENIPUNCTURE: CPT | Performed by: STUDENT IN AN ORGANIZED HEALTH CARE EDUCATION/TRAINING PROGRAM

## 2024-12-02 RX ADMIN — CEFTRIAXONE SODIUM 2 G: 2 INJECTION, POWDER, FOR SOLUTION INTRAMUSCULAR; INTRAVENOUS at 15:17

## 2024-12-02 RX ADMIN — IBUPROFEN 400 MG: 400 TABLET, FILM COATED ORAL at 23:14

## 2024-12-02 RX ADMIN — ACETAMINOPHEN 650 MG: 325 TABLET, FILM COATED ORAL at 06:10

## 2024-12-02 RX ADMIN — IBUPROFEN 400 MG: 400 TABLET, FILM COATED ORAL at 06:11

## 2024-12-02 RX ADMIN — ACETAMINOPHEN 650 MG: 325 TABLET, FILM COATED ORAL at 23:14

## 2024-12-02 RX ADMIN — IBUPROFEN 400 MG: 400 TABLET, FILM COATED ORAL at 13:29

## 2024-12-02 RX ADMIN — ACETAMINOPHEN 650 MG: 325 TABLET, FILM COATED ORAL at 13:29

## 2024-12-02 ASSESSMENT — ACTIVITIES OF DAILY LIVING (ADL)
ADLS_ACUITY_SCORE: 36

## 2024-12-02 NOTE — PLAN OF CARE
[id number]    Orientation: A&ox4  Aggression Stop Light: green  Activity: independent  Diet/BS Checks: regular  Tele:  none  IV Access/Drains: L PIV SL with intermittent IV antibiotics.  Pain Management: reported headache, tylenol x1 and ibuprofen x1 given as requested.   Abnormal VS/Results: VSS on RA except tachycardia  Bowel/Bladder: continent B/B. No BM this shift.  Skin/Wounds: RLE cellulitis LLE redness marked.   Consults: ID  D/C Disposition: pending    Other Info:   A&Ox4. VSS on Ra except tachycardia. reported headache, tylenol x1 and ibuprofen x1 given as requested.  Independent in room. Continent B/B. No BM this shift. L PIV SL with intermittent IV antibiotics. Regular diet. Discharge pending.

## 2024-12-02 NOTE — PLAN OF CARE
Goal Outcome Evaluation:      Plan of Care Reviewed With: patient    Overall Patient Progress: improving    Shift: 1097-1351 12/1/24    Medical   Diagnosis: R leg cellulitis  Mental Status: A/OX 4  Activity/dangle: Up independent in room  Diet: Regular diet, good appetite  Pain: Pain is managed by PRN Tylenol and Ibuprofen  Martinez/Voiding: Pt report voiding well per BR.  Tele/Restraints/Iso: None  02/LDA: on RA  D/C Date: Plan pending  Other Info:    VSS, afebrile. L leg shin has some redness noted and warm to touch, area marked. Hospitalist is aware.

## 2024-12-02 NOTE — PROGRESS NOTES
[facility]    Infectious Disease Progress Note    Date of Service (when I saw the patient): 12/02/2024     Assessment & Plan   Se Robins is a 39 year old male who was admitted on 11/26/2024.   Impression:  38 yo admitted with right  leg cellulitis. He has a past medical history of lymphangioma and thrombophlebitis of superficial veins of left lower extremity.   On ceftriaxone   Blood cultures with staph epi, most likely a contaminant in this setting.   Repeat blood culture pending   Slow ot improve         Recommendations   Continue ceftriaxone and clinda   Keep the LE elevated at all times sitting and lying down   Lotion on the dry feet   Anticipate slow improvement given pathology, edema, body habitus etc         Leslie Luong MD    Interval History   Tolerating antibiotics ok   No new rashes or issues with antibiotics   Labs reviewed   No changes to past medical, social or family history   Slight redness on the LEFT LE also       Physical Exam   Temp: 98.2  F (36.8  C) Temp src: Oral BP: 121/67 Pulse: 97   Resp: 18 SpO2: 94 % O2 Device: None (Room air)    Vitals:    11/26/24 1754 11/27/24 0055   Weight: (!) 163.3 kg (360 lb) (!) 167.8 kg (370 lb)     Vital Signs with Ranges  Temp:  [97.8  F (36.6  C)-99.3  F (37.4  C)] 98.2  F (36.8  C)  Pulse:  [] 97  Resp:  [16-18] 18  BP: (110-132)/(67-80) 121/67  SpO2:  [92 %-97 %] 94 %    Constitutional: Awake, alert, cooperative, no apparent distress  Lungs: Clear to auscultation bilaterally, no crackles or wheezing  Cardiovascular: Regular rate and rhythm, normal S1 and S2, and no murmur noted  Abdomen: Normal bowel sounds, soft, non-distended, non-tender  Skin: No rashes, no cyanosis, no edema  Other:    Medications   Current Facility-Administered Medications   Medication Dose Route Frequency Provider Last Rate Last Admin     Current Facility-Administered Medications   Medication Dose Route Frequency Provider Last Rate Last Admin     "cefTRIAXone (ROCEPHIN) 2 g vial to attach to  ml bag for ADULTS or NS 50 ml bag for PEDS  2 g Intravenous Q24H Tommy Ndiaye MD   2 g at 12/01/24 1427       Data   All microbiology laboratory data reviewed.  Recent Labs   Lab Test 12/02/24  0750 12/01/24  1226 11/30/24  1031   WBC 11.7* 11.6* 12.0*   HGB 13.0* 12.6* 12.8*   HCT 38.3* 37.8* 38.8*   MCV 87 87 87    390 401     Recent Labs   Lab Test 12/02/24  0750 12/01/24  1226 11/29/24  1106   CR 0.64* 0.62* 0.59*     Recent Labs   Lab Test 11/22/24  1335   SED 39*     No lab results found.    Invalid input(s): \"UC\"    All cultures:  Recent Labs   Lab 11/27/24  1521 11/26/24  1832   CULTURE No growth after 4 days Positive on the 1st day of incubation*  Staphylococcus epidermidis*      Blood culture:  Results for orders placed or performed during the hospital encounter of 11/26/24   Blood Culture Arm, Left    Collection Time: 11/27/24  3:21 PM    Specimen: Arm, Left; Blood   Result Value Ref Range    Culture No growth after 4 days    Blood Culture Peripheral Blood    Collection Time: 11/26/24  6:32 PM    Specimen: Peripheral Blood   Result Value Ref Range    Culture Positive on the 1st day of incubation (A)     Culture Staphylococcus epidermidis (AA)        Susceptibility    Staphylococcus epidermidis - KAM*     Oxacillin* <=0.25 Susceptible ug/mL      * Oxacillin susceptible isolates are susceptible to cephalosporins (example: cefazolin and cephalexin) and beta lactam combination agents. Oxacillin resistant isolates are resistant to these agents.     Gentamicin <=0.5 Susceptible ug/mL     Ciprofloxacin <=0.5 Susceptible ug/mL     Levofloxacin <=0.12 Susceptible ug/mL     Erythromycin <=0.25 Susceptible ug/mL     Clindamycin 0.25 Susceptible ug/mL     Vancomycin 2 Susceptible ug/mL     Daptomycin 0.25 Susceptible ug/mL     Tetracycline <=1 Susceptible ug/mL     Doxycycline 1 Susceptible ug/mL     * Antibiotics listed as \"No Interpretation\" have no " regulatory guidelines for susceptibility/resistance available.      Urine culture:  No results found for this or any previous visit.

## 2024-12-02 NOTE — PLAN OF CARE
Summary: 1900-2330 12/1/24 R leg cellulitis   Orientation: A/Ox4  Activity Level: ind  Fall Risk: no  Behavior & Aggression Tool Color: green   Pain Management: denies  ABNL VS/O2: VSS on RA  ABNL Lab/BG: n/a  Diet: reg  Bowel/Bladder: continent   Drains/Devices: PIV saline locked  Skin: R leg redness, some redness on L leg  Tests/Procedures for next shift: n/a  Anticipated DC date: TBD  Other Important Info:

## 2024-12-02 NOTE — PROGRESS NOTES
[facility]  Hospitalist Progress Note    Assessment & Plan   Se Robins is a 39 year old male admitted on 11/26/2024 for left leg cellulitis. He has a past medical history of lymphangioma and thrombophlebitis of superficial veins of right lower extremity.      Hospital course has been complicated by slow to improve cellulitis.  Now with involvement of left lower extremity.  Per ID, this is slow to improve cellulitis with no other process going on.     Right Leg Cellulitis  Staph epi Bacteremia, likely contaminant  Presented to the ED for evaluation of right lower extremity cellulitis. He was recently seen in urgent care on 11/22/24 for this and was prescribed Augmentin. His symptoms failed to improve, prompting him to return to urgent care on 11/26/2024. He was then referred to the ED for further work up and treatment. Of note, he reports a history of blood clot in his right leg, so a duplex ultrasound was completed 11/22 which was negative for blood clots. Since the onset of his symptoms, he has been having fevers as high as 101. He received a 1l IVF bolus in the ED and has been started on IV Ancef. Started on IV Ancef on admission, switched to clindamycin as blood cultures growing gram-positive cocci in clusters. ID consulted. Abx were switched to Ceftriaxone and Clindamycin. On 12/1/24 Clinda was stopped    *WBC: 13.5 on admission, down to 11.2  *Lactic acid:1.0    - DVT was reportedly ruled out at urgent care, prior to admission.   - CT of right lower leg with subcutaneous edema, no abscess or osteo noted.     - 11/26/24 Blood cultures with staph epidermidis  - 11/27/24 Blood Cx: No growth     - Continue IV Ceftriaxone     - ID Consulted and Following     Clinically Significant Risk Factors         # Hyponatremia: Lowest Na = 134 mmol/L in last 2 days, will monitor as appropriate  # Hypochloremia: Lowest Cl = 97 mmol/L in last 2 days, will monitor as appropriate                    "  # Severe Obesity: Estimated body mass index is 53.09 kg/m  as calculated from the following:    Height as of this encounter: 1.778 m (5' 10\").    Weight as of this encounter: 167.8 kg (370 lb).               Diet: Regular Diet Adult     DVT Prophylaxis: Ambulate every shift   Martinez Catheter: Not present  Lines: None     Cardiac Monitoring: None  Code Status: Full Code      Disposition Plan       Expected Discharge Date: 12/04/2024              Entered: Trang Gaona MD 12/02/2024, 2:21 PM     Notes Reviewed: ID and previous notes     Family Updated: Updated family at bedside on 12/2/24    Disposition: Likely 2-3 days for IV abx and final ID recs       Medically Ready for Discharge: Anticipated in 2-4 Days        Trang Gaona MD  Hospitalist Service   [facility]  Securely message with the Vocera Web Console (learn more here)         Medical Decision Making       60 MINUTES SPENT BY ME on the date of service doing chart review, history, exam, documentation & further activities per the note.           Interval History     No acute overnight events.    This morning the patient states that he is doing well. Feels that his redness, swelling and pain have improved.     -Data reviewed today: I reviewed all new labs and imaging results over the last 24 hours.     Physical Exam   Temp: 98.7  F (37.1  C) Temp src: Oral BP: 138/72 Pulse: 91   Resp: 18 SpO2: 98 % O2 Device: None (Room air)    Vitals:    11/26/24 1754 11/27/24 0055   Weight: (!) 163.3 kg (360 lb) (!) 167.8 kg (370 lb)     Vital Signs with Ranges  Temp:  [97.8  F (36.6  C)-99.3  F (37.4  C)] 98.7  F (37.1  C)  Pulse:  [] 91  Resp:  [16-18] 18  BP: (110-138)/(67-80) 138/72  SpO2:  [92 %-98 %] 98 %  I/O last 3 completed shifts:  In: 1140 [P.O.:1140]  Out: -       Constitutional: Awake, alert, cooperative, no apparent distress.   HEENT: PERRL, Normocephalic, without obvious abnormality, atraumatic, oral pharynx with " moist mucus membranes  Pulmonary: No increased work of breathing, good air exchange, clear to auscultation bilaterally, no crackles or wheezing.  Cardiovascular: Regular rate and rhythm, normal S1 and S2  GI: Normal bowel sounds, soft, non-distended, non-tender.  Skin/Integumen: Visualized skin appeared clear.  Neuro: CN II-XII grossly intact.  Psych:  Alert and oriented x 3.   Extremities: RLE: erythema, warmth and swelling present. LLE: mild erythema and warmth present on shin       Medications   Current Facility-Administered Medications   Medication Dose Route Frequency Provider Last Rate Last Admin     Current Facility-Administered Medications   Medication Dose Route Frequency Provider Last Rate Last Admin    cefTRIAXone (ROCEPHIN) 2 g vial to attach to  ml bag for ADULTS or NS 50 ml bag for PEDS  2 g Intravenous Q24H Tommy Ndiaye MD   2 g at 12/01/24 1427       Data   Recent Labs   Lab 12/02/24  0750 12/01/24  1226 11/30/24  1031 11/29/24  1106   WBC 11.7* 11.6* 12.0* 12.2*   HGB 13.0* 12.6* 12.8* 12.8*   MCV 87 87 87 88    390 401 363    134*  --  134*   POTASSIUM 4.2 4.1  --  4.2   CHLORIDE 98 97*  --  98   CO2 23 24  --  26   BUN 9.8 9.3  --  8.4   CR 0.64* 0.62*  --  0.59*   ANIONGAP 14 13  --  10   KIM 8.9 8.8  --  9.1   * 128*  --  118*       No results found for this or any previous visit (from the past 24 hours).

## 2024-12-03 LAB
CRP SERPL-MCNC: 122.21 MG/L
ERYTHROCYTE [DISTWIDTH] IN BLOOD BY AUTOMATED COUNT: 13.5 % (ref 10–15)
HCT VFR BLD AUTO: 36.7 % (ref 40–53)
HGB BLD-MCNC: 11.9 G/DL (ref 13.3–17.7)
MCH RBC QN AUTO: 28.6 PG (ref 26.5–33)
MCHC RBC AUTO-ENTMCNC: 32.4 G/DL (ref 31.5–36.5)
MCV RBC AUTO: 88 FL (ref 78–100)
PLATELET # BLD AUTO: 430 10E3/UL (ref 150–450)
RBC # BLD AUTO: 4.16 10E6/UL (ref 4.4–5.9)
WBC # BLD AUTO: 10.4 10E3/UL (ref 4–11)

## 2024-12-03 PROCEDURE — 86140 C-REACTIVE PROTEIN: CPT | Performed by: STUDENT IN AN ORGANIZED HEALTH CARE EDUCATION/TRAINING PROGRAM

## 2024-12-03 PROCEDURE — 85027 COMPLETE CBC AUTOMATED: CPT | Performed by: STUDENT IN AN ORGANIZED HEALTH CARE EDUCATION/TRAINING PROGRAM

## 2024-12-03 PROCEDURE — 250N000011 HC RX IP 250 OP 636: Performed by: INTERNAL MEDICINE

## 2024-12-03 PROCEDURE — 250N000013 HC RX MED GY IP 250 OP 250 PS 637

## 2024-12-03 PROCEDURE — 36415 COLL VENOUS BLD VENIPUNCTURE: CPT | Performed by: STUDENT IN AN ORGANIZED HEALTH CARE EDUCATION/TRAINING PROGRAM

## 2024-12-03 PROCEDURE — 120N000001 HC R&B MED SURG/OB

## 2024-12-03 PROCEDURE — 250N000013 HC RX MED GY IP 250 OP 250 PS 637: Performed by: HOSPITALIST

## 2024-12-03 PROCEDURE — 99232 SBSQ HOSP IP/OBS MODERATE 35: CPT | Performed by: STUDENT IN AN ORGANIZED HEALTH CARE EDUCATION/TRAINING PROGRAM

## 2024-12-03 RX ADMIN — IBUPROFEN 400 MG: 400 TABLET, FILM COATED ORAL at 21:53

## 2024-12-03 RX ADMIN — ACETAMINOPHEN 650 MG: 325 TABLET, FILM COATED ORAL at 21:53

## 2024-12-03 RX ADMIN — ACETAMINOPHEN 650 MG: 325 TABLET, FILM COATED ORAL at 15:03

## 2024-12-03 RX ADMIN — CEFTRIAXONE SODIUM 2 G: 2 INJECTION, POWDER, FOR SOLUTION INTRAMUSCULAR; INTRAVENOUS at 14:44

## 2024-12-03 RX ADMIN — IBUPROFEN 400 MG: 400 TABLET, FILM COATED ORAL at 05:27

## 2024-12-03 RX ADMIN — ACETAMINOPHEN 650 MG: 325 TABLET, FILM COATED ORAL at 05:27

## 2024-12-03 RX ADMIN — IBUPROFEN 400 MG: 400 TABLET, FILM COATED ORAL at 15:04

## 2024-12-03 ASSESSMENT — ACTIVITIES OF DAILY LIVING (ADL)
ADLS_ACUITY_SCORE: 36

## 2024-12-03 NOTE — PLAN OF CARE
Goal Outcome Evaluation:    Orientations: Alert and oriented x 4  Vitals/Pain: Vital signs stable on room air. Pain controlled with scheduled tylenol.  Tele: n/a  Skin/Wounds: scattered bruising  GI/: Bowel sounds active, flatus present.  Labs: waiting  Ambulation/Assist: Up with assist of .  Sleep Quality: good  Plan: continue plan of care

## 2024-12-03 NOTE — PROGRESS NOTES
Shift Summary 0700-1930    Admitting Diagnosis: Cellulitis of right leg [L03.115]   Vitals:Baseline, RA  Pain:Tylenol & ibuprofen   A&Ox4  Voiding: BR  Mobility:IND  Tele:NA  CMS:Intact  Lung Sounds:Clear, denies SOB  GI:Continent  Dressing:NA  Diet: regular     Other:RLE redness and swelling.ID following.Intermittent abx.

## 2024-12-03 NOTE — PROGRESS NOTES
[facility]  Hospitalist Progress Note    Assessment & Plan   Se Robins is a 39 year old male admitted on 11/26/2024 for left leg cellulitis. He has a past medical history of lymphangioma and thrombophlebitis of superficial veins of right lower extremity.      Hospital course has been complicated by slow to improve cellulitis.  Now with involvement of left lower extremity.  Per ID, this is slow to improve cellulitis with no other process going on.     Right Leg Cellulitis  Staph epi Bacteremia, likely contaminant  Leukocytosis: Resolved   Presented to the ED for evaluation of right lower extremity cellulitis. He was recently seen in urgent care on 11/22/24 for this and was prescribed Augmentin. His symptoms failed to improve, prompting him to return to urgent care on 11/26/2024. He was then referred to the ED for further work up and treatment. Of note, he reports a history of blood clot in his right leg, so a duplex ultrasound was completed 11/22 which was negative for blood clots. Since the onset of his symptoms, he has been having fevers as high as 101. He received a 1l IVF bolus in the ED and has been started on IV Ancef. Started on IV Ancef on admission, switched to clindamycin as blood cultures growing gram-positive cocci in clusters. ID consulted. Abx were switched to Ceftriaxone and Clindamycin. On 12/1/24 Clinda was stopped     *WBC: 13.5 on admission, down to 10.4  *Lactic acid:1.0  *CRP: 122 (improving)      - DVT was reportedly ruled out at urgent care, prior to admission.   - CT of right lower leg with subcutaneous edema, no abscess or osteo noted.      - 11/26/24 Blood cultures with staph epidermidis  - 11/27/24 Blood Cx: No growth      - Continue IV Ceftriaxone      - ID Consulted and Following    - May need a couple of days of IV abx before transitioning to PO     Anemia   - Hgb: 14.3 > 13.7 > 12.5 > 13.0 > 11.9  - No signs of bleeding  - Continue to  "monitor      Clinically Significant Risk Factors                              # Severe Obesity: Estimated body mass index is 53.09 kg/m  as calculated from the following:    Height as of this encounter: 1.778 m (5' 10\").    Weight as of this encounter: 167.8 kg (370 lb).               Diet: Regular Diet Adult     DVT Prophylaxis: Ambulate every shift   Martinez Catheter: Not present  Lines: None     Cardiac Monitoring: None  Code Status: Full Code      Disposition Plan      Expected Discharge Date: 12/04/2024              Entered: Trang Gaona MD 12/03/2024, 2:49 PM     Family Updated: Spoke to family at bedside on 12/3/24     Disposition: Likely 2-3 days for IV abx and final ID recs       Medically Ready for Discharge: Anticipated in 2-4 Days        Trang Gaona MD  Hospitalist Service   [facility]  Securely message with the Vocera Web Console (learn more here)         Medical Decision Making       45 MINUTES SPENT BY ME on the date of service doing chart review, history, exam, documentation & further activities per the note.           Interval History     No acute overnight events.    This morning the patient states that he is doing well. Feels that his RLE is improving.     -Data reviewed today: I reviewed all new labs and imaging results over the last 24 hours.    Physical Exam   Temp: 98.2  F (36.8  C) Temp src: Oral BP: 123/64 Pulse: 88   Resp: 18 SpO2: 92 % O2 Device: None (Room air)    Vitals:    11/26/24 1754 11/27/24 0055   Weight: (!) 163.3 kg (360 lb) (!) 167.8 kg (370 lb)     Vital Signs with Ranges  Temp:  [98.2  F (36.8  C)-99.1  F (37.3  C)] 98.2  F (36.8  C)  Pulse:  [] 88  Resp:  [18] 18  BP: (108-129)/(64-81) 123/64  SpO2:  [92 %-98 %] 92 %  No intake/output data recorded.      Constitutional: Awake, alert, cooperative, no apparent distress.   HEENT: PERRL, Normocephalic, without obvious abnormality, atraumatic, oral pharynx with moist mucus " membranes  Pulmonary: No increased work of breathing, good air exchange, clear to auscultation bilaterally, no crackles or wheezing.  Cardiovascular: Regular rate and rhythm, normal S1 and S2  GI: Normal bowel sounds, soft, non-distended, non-tender.  Skin/Integumen: Visualized skin appeared clear.  Neuro: CN II-XII grossly intact.  Psych:  Alert and oriented x 3.   Extremities: RLE: erythema, warmth and swelling present (improving). LLE: mild erythema and warmth present on shin  (stable)      Medications   Current Facility-Administered Medications   Medication Dose Route Frequency Provider Last Rate Last Admin     Current Facility-Administered Medications   Medication Dose Route Frequency Provider Last Rate Last Admin    cefTRIAXone (ROCEPHIN) 2 g vial to attach to  ml bag for ADULTS or NS 50 ml bag for PEDS  2 g Intravenous Q24H Tommy Ndiaye MD   2 g at 12/02/24 1517       Data   Recent Labs   Lab 12/03/24  0915 12/02/24  0750 12/01/24  1226 11/30/24  1031 11/29/24  1106   WBC 10.4 11.7* 11.6*   < > 12.2*   HGB 11.9* 13.0* 12.6*   < > 12.8*   MCV 88 87 87   < > 88    425 390   < > 363   NA  --  135 134*  --  134*   POTASSIUM  --  4.2 4.1  --  4.2   CHLORIDE  --  98 97*  --  98   CO2  --  23 24  --  26   BUN  --  9.8 9.3  --  8.4   CR  --  0.64* 0.62*  --  0.59*   ANIONGAP  --  14 13  --  10   KIM  --  8.9 8.8  --  9.1   GLC  --  128* 128*  --  118*    < > = values in this interval not displayed.       No results found for this or any previous visit (from the past 24 hours).

## 2024-12-03 NOTE — PLAN OF CARE
Care provided from [id number]. Pt admitted 11/26 w/RLE cellulitis.     A&O x 4. Low-grade fever and tachycardic at times, otherwise VSS on RA. Pt reports intermittent fever/chills ongoing since admission, relieved w/PRN tylenol and ibuprofen. Independent in room. Regular diet, tolerating well. PIV SL. BLE mary carmen/swollen/tender. RLE cellulitis now involving LLE, redness outlined. Fever/discomfort relieved w/PRN tylenol and ibuprofen x1. Continue plan of care.

## 2024-12-04 PROCEDURE — 99232 SBSQ HOSP IP/OBS MODERATE 35: CPT | Performed by: STUDENT IN AN ORGANIZED HEALTH CARE EDUCATION/TRAINING PROGRAM

## 2024-12-04 PROCEDURE — 250N000011 HC RX IP 250 OP 636: Performed by: INTERNAL MEDICINE

## 2024-12-04 PROCEDURE — 120N000001 HC R&B MED SURG/OB

## 2024-12-04 PROCEDURE — 250N000013 HC RX MED GY IP 250 OP 250 PS 637

## 2024-12-04 PROCEDURE — 250N000013 HC RX MED GY IP 250 OP 250 PS 637: Performed by: HOSPITALIST

## 2024-12-04 PROCEDURE — 99232 SBSQ HOSP IP/OBS MODERATE 35: CPT | Performed by: INTERNAL MEDICINE

## 2024-12-04 RX ADMIN — IBUPROFEN 400 MG: 400 TABLET, FILM COATED ORAL at 16:31

## 2024-12-04 RX ADMIN — IBUPROFEN 400 MG: 400 TABLET, FILM COATED ORAL at 06:13

## 2024-12-04 RX ADMIN — CEFTRIAXONE SODIUM 2 G: 2 INJECTION, POWDER, FOR SOLUTION INTRAMUSCULAR; INTRAVENOUS at 14:51

## 2024-12-04 RX ADMIN — ACETAMINOPHEN 650 MG: 325 TABLET, FILM COATED ORAL at 06:13

## 2024-12-04 RX ADMIN — IBUPROFEN 400 MG: 400 TABLET, FILM COATED ORAL at 23:43

## 2024-12-04 RX ADMIN — ACETAMINOPHEN 650 MG: 325 TABLET, FILM COATED ORAL at 16:31

## 2024-12-04 RX ADMIN — ACETAMINOPHEN 650 MG: 325 TABLET, FILM COATED ORAL at 23:43

## 2024-12-04 ASSESSMENT — ACTIVITIES OF DAILY LIVING (ADL)
ADLS_ACUITY_SCORE: 36

## 2024-12-04 NOTE — PLAN OF CARE
Goal Outcome Evaluation:      Plan of Care Reviewed With: patient    Overall Patient Progress: improvingOverall Patient Progress: improving      Goal Outcome Evaluation:      Shift Summary [id number]    Admitting Diagnosis: Cellulitis of right leg [L03.115]   Vitals: Stable on room air  Pain: Tylenol and Ibuprofen, per pt, he takes them together  A&Ox4  Voiding: Adequately in bathroom  Mobility: Ambulates independently in room    CMS: intact  Lung Sounds clear on room air.  GI :WNL      Orders Placed This Encounter      Regular Diet Adult       Plan: Pt A&Ox4, VSS on room air.Tylenol and Ibuprofen, per pt, he takes them together. RLE redness and swelling. Continue with plan of care.

## 2024-12-04 NOTE — PROGRESS NOTES
Shift Summary 0700-1930     Admitting Diagnosis: Cellulitis of right leg [L03.115]   Vitals:Baseline, RA  Pain:Tylenol & ibuprofen, per pt takes both together.  A&Ox4  Voiding: BR  Mobility:IND  Tele:NA  CMS:Intact  Lung Sounds:Clear, denies SOB  GI:Continent  Dressing:NA  Diet: regular     Other:RLE redness and swelling.ID following.Intermittent abx.Redness and rash noted on the R arm.

## 2024-12-04 NOTE — PROGRESS NOTES
[facility]  Hospitalist Progress Note    Assessment & Plan   Se Robins is a 39 year old male admitted on 11/26/2024 for left leg cellulitis. He has a past medical history of lymphangioma and thrombophlebitis of superficial veins of right lower extremity.      Hospital course has been complicated by slow to improve cellulitis.  Now with involvement of left lower extremity.  Per ID, this is slow to improve cellulitis with no other process going on.     Right Leg Cellulitis  Staph epi Bacteremia, likely contaminant  Leukocytosis: Resolved   Presented to the ED for evaluation of right lower extremity cellulitis. He was recently seen in urgent care on 11/22/24 for this and was prescribed Augmentin. His symptoms failed to improve, prompting him to return to urgent care on 11/26/2024. He was then referred to the ED for further work up and treatment. Of note, he reports a history of blood clot in his right leg, so a duplex ultrasound was completed 11/22 which was negative for blood clots. Since the onset of his symptoms, he has been having fevers as high as 101. He received a 1l IVF bolus in the ED and has been started on IV Ancef. Started on IV Ancef on admission, switched to clindamycin as blood cultures growing gram-positive cocci in clusters. ID consulted. Abx were switched to Ceftriaxone and Clindamycin. On 12/1/24 Clinda was stopped     *WBC: 13.5 on admission, down to 10.4  *Lactic acid:1.0  *CRP: 122 (improving)      - DVT was reportedly ruled out at urgent care, prior to admission.   - CT of right lower leg with subcutaneous edema, no abscess or osteo noted.      - 11/26/24 Blood cultures with staph epidermidis  - 11/27/24 Blood Cx: No growth      - Continue IV Ceftriaxone      - ID Consulted and Following                - May need a couple of days of IV abx before transitioning to PO      Anemia   - Hgb: 14.3 > 13.7 > 12.5 > 13.0 > 11.9  - No signs of bleeding  - Continue to  "monitor    Clinically Significant Risk Factors                              # Severe Obesity: Estimated body mass index is 53.09 kg/m  as calculated from the following:    Height as of this encounter: 1.778 m (5' 10\").    Weight as of this encounter: 167.8 kg (370 lb).               Diet: Regular Diet Adult     DVT Prophylaxis: Pneumatic Compression Devices and Ambulate every shift   Martinez Catheter: Not present  Lines: None     Cardiac Monitoring: None  Code Status: Full Code      Disposition Plan       Expected Discharge Date: 12/06/2024              Entered: Trang Gaona MD 12/04/2024, 12:06 PM     Care Team Updated: Nursing updated     Disposition: Likley another couple days for IV abx and final ID recs      Medically Ready for Discharge: Anticipated in 2-4 Days        Trang Gaona MD  Hospitalist Service   [facility]  Securely message with the Vocera Web Console (learn more here)         Medical Decision Making       45 MINUTES SPENT BY ME on the date of service doing chart review, history, exam, documentation & further activities per the note.           Interval History     No acute overnight events.     This morning the patient states that he is doing okay. States that the pain has improved but he continues to have subjective fevers. States the swelling seems to have improved.     -Data reviewed today: I reviewed all new labs and imaging results over the last 24 hours.     Physical Exam   Temp: 98.6  F (37  C) Temp src: Axillary BP: (!) 142/78 Pulse: 99   Resp: 18 SpO2: 94 % O2 Device: None (Room air)    Vitals:    11/26/24 1754 11/27/24 0055   Weight: (!) 163.3 kg (360 lb) (!) 167.8 kg (370 lb)     Vital Signs with Ranges  Temp:  [97.7  F (36.5  C)-98.6  F (37  C)] 98.6  F (37  C)  Pulse:  [] 99  Resp:  [16-18] 18  BP: (118-148)/(67-78) 142/78  SpO2:  [92 %-96 %] 94 %  I/O last 3 completed shifts:  In: 240 [P.O.:240]  Out: -       Constitutional: Awake, alert, " cooperative, no apparent distress.   HEENT: PERRL, Normocephalic, without obvious abnormality, atraumatic, oral pharynx with moist mucus membranes  Pulmonary: No increased work of breathing, good air exchange, clear to auscultation bilaterally, no crackles or wheezing.  Cardiovascular: Regular rate and rhythm, normal S1 and S2  GI: Normal bowel sounds, soft, non-distended, non-tender.  Skin/Integumen: Visualized skin appeared clear.  Neuro: CN II-XII grossly intact.  Psych:  Alert and oriented x 3.   Extremities: RLE: erythema, warmth and swelling present (stable). LLE: Erythema and warmth present on shin (slightly increased)    Medications   Current Facility-Administered Medications   Medication Dose Route Frequency Provider Last Rate Last Admin     Current Facility-Administered Medications   Medication Dose Route Frequency Provider Last Rate Last Admin    cefTRIAXone (ROCEPHIN) 2 g vial to attach to  ml bag for ADULTS or NS 50 ml bag for PEDS  2 g Intravenous Q24H Tommy Ndiaye MD   2 g at 12/03/24 1444       Data   Recent Labs   Lab 12/03/24  0915 12/02/24  0750 12/01/24  1226 11/30/24  1031 11/29/24  1106   WBC 10.4 11.7* 11.6*   < > 12.2*   HGB 11.9* 13.0* 12.6*   < > 12.8*   MCV 88 87 87   < > 88    425 390   < > 363   NA  --  135 134*  --  134*   POTASSIUM  --  4.2 4.1  --  4.2   CHLORIDE  --  98 97*  --  98   CO2  --  23 24  --  26   BUN  --  9.8 9.3  --  8.4   CR  --  0.64* 0.62*  --  0.59*   ANIONGAP  --  14 13  --  10   KIM  --  8.9 8.8  --  9.1   GLC  --  128* 128*  --  118*    < > = values in this interval not displayed.       No results found for this or any previous visit (from the past 24 hours).

## 2024-12-04 NOTE — PROGRESS NOTES
CLINICAL NUTRITION SERVICES BRIEF NOTE.  Chart reviewed for LOS    Diet: Regular. Tolerating 100% of meals w/ good appetite.   No reported unintended weight loss.   No wounds documented.  No indication for full nutrition assessment at this time. Will reevaluate in 7-10 days. Available for consult if needed.

## 2024-12-05 VITALS
HEIGHT: 70 IN | SYSTOLIC BLOOD PRESSURE: 129 MMHG | OXYGEN SATURATION: 96 % | TEMPERATURE: 97.8 F | DIASTOLIC BLOOD PRESSURE: 72 MMHG | RESPIRATION RATE: 18 BRPM | WEIGHT: 315 LBS | BODY MASS INDEX: 45.1 KG/M2 | HEART RATE: 89 BPM

## 2024-12-05 PROCEDURE — 250N000013 HC RX MED GY IP 250 OP 250 PS 637: Performed by: HOSPITALIST

## 2024-12-05 PROCEDURE — 250N000013 HC RX MED GY IP 250 OP 250 PS 637

## 2024-12-05 PROCEDURE — 99239 HOSP IP/OBS DSCHRG MGMT >30: CPT | Performed by: STUDENT IN AN ORGANIZED HEALTH CARE EDUCATION/TRAINING PROGRAM

## 2024-12-05 PROCEDURE — 250N000011 HC RX IP 250 OP 636: Performed by: INTERNAL MEDICINE

## 2024-12-05 RX ADMIN — ACETAMINOPHEN 650 MG: 325 TABLET, FILM COATED ORAL at 07:53

## 2024-12-05 RX ADMIN — IBUPROFEN 400 MG: 400 TABLET, FILM COATED ORAL at 07:53

## 2024-12-05 RX ADMIN — CEFTRIAXONE SODIUM 2 G: 2 INJECTION, POWDER, FOR SOLUTION INTRAMUSCULAR; INTRAVENOUS at 11:15

## 2024-12-05 ASSESSMENT — ACTIVITIES OF DAILY LIVING (ADL)
ADLS_ACUITY_SCORE: 36
DEPENDENT_IADLS:: INDEPENDENT
ADLS_ACUITY_SCORE: 36
ADLS_ACUITY_SCORE: 36

## 2024-12-05 NOTE — PLAN OF CARE
Goal Outcome Evaluation:    Orientations: Alert and oriented x 4  Vitals/Pain: Vital signs stable on room air. Pain controlled with scheduled tylenol and ibuprofen.  Tele: n/a  Skin/Wounds: scattered bruising  GI/: Bowel sounds active, flatus present.  Labs: waiting  Ambulation/Assist: Up with assist of .  Sleep Quality: good  Plan: continue plan of care

## 2024-12-05 NOTE — PROGRESS NOTES
Shift Summary [id number]     Admitting Diagnosis: Cellulitis of right leg [L03.115]   Vitals:Baseline, RA  Pain:Tylenol & ibuprofen, per pt takes both together.  A&Ox4  Voiding: BR  Mobility:IND  Tele:NA  CMS:Intact  Lung Sounds:Clear, denies SOB  GI:Continent  Dressing:NA  Diet: regular     Other:RLE redness, edema and swelling.Intermittent abx.Possible Per ID discharge tomorrow.

## 2024-12-05 NOTE — CONSULTS
Care Management Initial Consult    General Information  Assessment completed with: Se Whitfield  Type of CM/SW Visit: Initial Assessment    Primary Care Provider verified and updated as needed: No (will schedule follow-up and establish PCP at [facility])   Readmission within the last 30 days: no previous admission in last 30 days      Reason for Consult: discharge planning  Advance Care Planning: Advance Care Planning Reviewed: no concerns identified          Communication Assessment  Patient's communication style: spoken language (English or Bilingual)    Hearing Difficulty or Deaf: no   Wear Glasses or Blind: no    Cognitive  Cognitive/Neuro/Behavioral: WDL                      Living Environment:   People in home: child(thom), dependent     Current living Arrangements: house      Able to return to prior arrangements: yes       Family/Social Support:  Care provided by: self  Provides care for: child(thom)  Marital Status:   Support system: Children          Description of Support System:           Current Resources:   Patient receiving home care services: No        Community Resources: None  Equipment currently used at home: none  Supplies currently used at home: None    Employment/Financial:  Employment Status:          Financial Concerns: none   Referral to Financial Worker: No       Does the patient's insurance plan have a 3 day qualifying hospital stay waiver?  No    Lifestyle & Psychosocial Needs:  Social Drivers of Health     Food Insecurity: Low Risk  (11/27/2024)    Food Insecurity     Within the past 12 months, did you worry that your food would run out before you got money to buy more?: No     Within the past 12 months, did the food you bought just not last and you didn t have money to get more?: No   Depression: Not at risk (9/13/2024)    PHQ-2     PHQ-2 Score: 0   Housing Stability: Low Risk  (11/27/2024)    Housing Stability     Do you have housing? : Yes     Are you worried about losing  your housing?: No   Tobacco Use: Medium Risk (11/22/2024)    Patient History     Smoking Tobacco Use: Former     Smokeless Tobacco Use: Never     Passive Exposure: Not on file   Financial Resource Strain: Low Risk  (11/27/2024)    Financial Resource Strain     Within the past 12 months, have you or your family members you live with been unable to get utilities (heat, electricity) when it was really needed?: No   Alcohol Use: Not on file   Transportation Needs: Low Risk  (11/27/2024)    Transportation Needs     Within the past 12 months, has lack of transportation kept you from medical appointments, getting your medicines, non-medical meetings or appointments, work, or from getting things that you need?: No   Physical Activity: Not on file   Interpersonal Safety: Not on file   Stress: Not on file   Social Connections: Not on file   Health Literacy: Not on file       Functional Status:  Prior to admission patient needed assistance:   Dependent ADLs:: Independent  Dependent IADLs:: Independent       Mental Health Status:  Mental Health Status: No Current Concerns       Chemical Dependency Status:  Chemical Dependency Status: No Current Concerns             Values/Beliefs:  Spiritual, Cultural Beliefs, Tenriism Practices, Values that affect care: no               Discussed  Partnership in Safe Discharge Planning  document with patient/family: Yes: Se    Additional Information:  Writer met with Se and introduced myself and role in discharge planning. He verified his home address, phone, and insurance info. Writer was asked to assist him with establishing PCP. He has seen Rakel GOULD's at the [facility], but Se told me, he prefers the [place] location. He is being discharged home today, and is scheduled at [place] for hospital follow up on Monday, December 9th at 1:50pm. This is visible on his AVS    Next Steps: Discharge home    Fabby Truong RN Care Coordinator  [facility]  [facility]  [phone]

## 2024-12-05 NOTE — DISCHARGE SUMMARY
[facility]  Hospitalist Discharge Summary     Admit Date:  11/26/2024  Discharge Date:     12/5/2024  Discharging Provider: Trang Gaona MD    PRIMARY CARE PROVIDER:    No Ref-Primary, Physician     DISCHARGE DIAGNOSES:     Right Leg Cellulitis: Slow Improvement  Development of Left Leg Cellulitis: Slow Improvement  Staph epi Bacteremia, likely contaminant  Leukocytosis: Resolved   Anemia      BRIEF HISTORY OF PRESENT ILLNESS/HOSPITAL COURSE:   Se Robins is a 39 year old male admitted on 11/26/2024 for left leg cellulitis. He has a past medical history of lymphangioma and thrombophlebitis of superficial veins of right lower extremity.      Hospital course has been complicated by slow to improve cellulitis.  Now with involvement of left lower extremity.  Per ID, this is slow to improve cellulitis with no other process going on.     Right Leg Cellulitis: Slow Improvement  Development of Left Leg Cellulitis: Slow Improvement  Staph epi Bacteremia, likely contaminant  Leukocytosis: Resolved  Presented to the ED for evaluation of right lower extremity cellulitis. He was recently seen in urgent care on 11/22/24 for this and was prescribed Augmentin. His symptoms failed to improve, prompting him to return to urgent care on 11/26/2024. He was then referred to the ED for further work up and treatment. Of note, he reports a history of blood clot in his right leg, so a duplex ultrasound was completed 11/22 which was negative for blood clots. Since the onset of his symptoms, he has been having fevers as high as 101. He received a 1l IVF bolus in the ED and has been started on IV Ancef. Started on IV Ancef on admission, switched to clindamycin as blood cultures growing gram-positive cocci in clusters. ID consulted. Abx were switched to Ceftriaxone and Clindamycin. On 12/1/24 Clinda was stopped. Patient has slow improvement on Ceftriaxone. Ultimately, ID felt that the patient could discharge  "with another 10 days of Augmentin with anticipated slow improvement given pathology, edema and body habitus.      *WBC: 13.5 on admission, down to 10.4  *Lactic acid:1.0  *CRP: 122 (improving)      - DVT was ruled out at urgent care  - CT of right lower leg with subcutaneous edema, no abscess or osteo noted.      - 11/26/24 Blood cultures with staph epidermidis  - 11/27/24 Blood Cx: No growth      - Will give last dose of IV Ceftriaxone on 12/5/24 prior to discharge   - Transition to PO Augmentin BID starting on 12/6/24 for ten days      - ID Consulted and Following   Keep the LE elevated at all times sitting and lying down   Lotion on the dry feet   Anticipate slow improvement given pathology, edema, body habitus etc   Most likely discharge tomorrow can be discharged on Augmentin for 10 days, noted he was on Augmentin before coming to the hospital but sometimes acute cellulitis require IV antibiotics during the initial phase    Anemia   - Hgb: 14.3 > 13.7 > 12.5 > 13.0 > 11.9  - No signs of bleeding  - Continue to monitor       Clinically Significant Risk Factors                              # Severe Obesity: Estimated body mass index is 53.09 kg/m  as calculated from the following:    Height as of this encounter: 1.778 m (5' 10\").    Weight as of this encounter: 167.8 kg (370 lb).        # Financial/Environmental Concerns: none              TOTAL DISCHARGE TIME:  Trang RIVERA MD, personally saw the patient today and spent greater than 30 minutes discharging this patient.    Trang Gaona MD  [facility]  Hospitalist Service   Securely message with the Vocera Web Console (learn more here)  Text page via [facility] Paging/Directory        Significant Results and Procedures   N/a    Pending Results   These results will be followed up by n/a  Unresulted Labs Ordered in the Past 30 Days of this Admission       No orders found from 10/27/2024 to 11/27/2024.            Code Status "   Full Code       Primary Care Physician   Physician No Ref-Primary    Physical Exam   Temp: 97.8  F (36.6  C) Temp src: Axillary BP: 129/72 Pulse: 89   Resp: 18 SpO2: 96 % O2 Device: None (Room air)    Vitals:    11/26/24 1754 11/27/24 0055   Weight: (!) 163.3 kg (360 lb) (!) 167.8 kg (370 lb)     Vital Signs with Ranges  Temp:  [97.8  F (36.6  C)-98.4  F (36.9  C)] 97.8  F (36.6  C)  Pulse:  [89-96] 89  Resp:  [17-18] 18  BP: (129-140)/(72-82) 129/72  SpO2:  [96 %-98 %] 96 %  I/O last 3 completed shifts:  In: 660 [P.O.:660]  Out: -     Constitutional: Awake, alert, cooperative, no apparent distress.   HEENT: PERRL, Normocephalic, without obvious abnormality, atraumatic, oral pharynx with moist mucus membranes  Pulmonary: No increased work of breathing, good air exchange, clear to auscultation bilaterally, no crackles or wheezing.  Cardiovascular: Regular rate and rhythm, normal S1 and S2  GI: Normal bowel sounds, soft, non-distended, non-tender.  Skin/Integumen: Visualized skin appeared clear.  Neuro: CN II-XII grossly intact.  Psych:  Alert and oriented x 3.   Extremities: RLE: Erythema has become more dark and more chronic appearing, bright erythmea noted on calf but likely related to gravity, warmth and swelling has improved. LLE: erythema, warmth and swelling has improved.     Discharge Disposition   Discharged to home  Condition at discharge: Stable    Consultations This Hospital Stay   INFECTIOUS DISEASES IP CONSULT  VASCULAR ACCESS ADULT IP CONSULT  CARE MANAGEMENT / SOCIAL WORK IP CONSULT      Discharge Orders      Reason for your hospital stay    You were admitted to the hospital for right leg cellulitis. You were started of IV antibiotics. Infectious disease was consulted. Infectious disease anticipates a slow improvement. You will continue on oral antibiotics called Augmentin for another 10 days.     Follow-up and recommended labs and tests     Follow up with primary care provider, Physician No  Ref-Primary, within 7-10 days for hospital follow- up.  No follow up labs or test are needed.     Activity    Your activity upon discharge: activity as tolerated     Discharge Instructions    1. Keep the LE elevated at all times sitting and lying down   2. Lotion on the dry feet     Diet    Follow this diet upon discharge: Regular Diet Adult     Discharge Medications   Current Discharge Medication List        CONTINUE these medications which have CHANGED    Details   amoxicillin-clavulanate (AUGMENTIN) 875-125 MG tablet Take 1 tablet by mouth 2 times daily for 10 days.  Qty: 20 tablet, Refills: 0    Associated Diagnoses: Cellulitis of right leg           CONTINUE these medications which have NOT CHANGED    Details   ammonium lactate (AMLACTIN) 12 % external cream Apply topically 2 times daily.  Qty: 385 g, Refills: 3    Associated Diagnoses: Dry skin; Fissure in skin of both feet      hydrocortisone 2.5 % cream MIX 1:1 WITH 2% Ketoconazole CREAM AND APPLY TO AFFECTED AREAS TWICE DAILY FOR UP TO 14 DAYS  Qty: 30 g, Refills: 3    Associated Diagnoses: Seborrheic dermatitis      ketoconazole (NIZORAL) 2 % external cream MIX 1:1 WITH 2.5 % HYDROCORTISONE CREAM AND APPLY TO AFFECTED AREAS TWICE DAILY FOR UP TO 14 DAYS  Qty: 30 g, Refills: 3    Associated Diagnoses: Seborrheic dermatitis      ketoconazole (NIZORAL) 2 % external shampoo Massage into wet scalp, let sit 3-5 min, then rinse. Do this 3x weekly.  Qty: 120 mL, Refills: 11    Associated Diagnoses: Seborrheic dermatitis           Allergies   No Known Allergies  Data   Most Recent 3 CBC's:  Recent Labs   Lab Test 12/03/24  0915 12/02/24  0750 12/01/24  1226   WBC 10.4 11.7* 11.6*   HGB 11.9* 13.0* 12.6*   MCV 88 87 87    425 390      Most Recent 3 BMP's:  Recent Labs   Lab Test 12/02/24  0750 12/01/24  1226 11/29/24  1106    134* 134*   POTASSIUM 4.2 4.1 4.2   CHLORIDE 98 97* 98   CO2 23 24 26   BUN 9.8 9.3 8.4   CR 0.64* 0.62* 0.59*   ANIONGAP 14 13  10   KIM 8.9 8.8 9.1   * 128* 118*     Most Recent 2 LFT's:No lab results found.  Most Recent INR's and Anticoagulation Dosing History:  Anticoagulation Dose History           No data to display              Most Recent 3 Troponin's:No lab results found.  Most Recent Cholesterol Panel:No lab results found.  Most Recent 6 Bacteria Isolates From Any Culture (See EPIC Reports for Culture Details):No lab results found.  Most Recent TSH, T4 and A1c Labs:No lab results found.  Results for orders placed or performed during the hospital encounter of 11/26/24   CT Tibia Fibula Lower Leg Right w Contrast    Narrative    EXAM: CT TIBIA FIBULA LOWER LEG RIGHT W CONTRAST  LOCATION: [facility]  DATE: 11/28/2024    INDICATION: worsening cellulitis  COMPARISON: None available time of dictation..  TECHNIQUE: IV contrast. Axial, sagittal and coronal thin-section reconstruction. Dose reduction techniques were used.   CONTRAST: 90ml isovue 370    FINDINGS:     BONES:  -No acute fracture or dislocation.  -No CT evidence of osteomyelitis.  -Mild degenerative changes of the knee.    SOFT TISSUES:  -Diffuse subcutaneous edema and skin thickening throughout the lower extremity, favored represent cellulitis given the clinical scenario.   -No discrete rim-enhancing fluid collection to suggest abscess.  -Prominent varicosities throughout the lower extremity.        Impression    IMPRESSION:  1.  Diffuse subcutaneous edema and skin thickening throughout the lower extremity, favored represent cellulitis given the clinical scenario.   2.  No evidence of abscess or CT evidence of osteomyelitis.  3.  Prominent varicosities throughout the lower extremity.     CT Head w/o Contrast    Narrative    EXAM: CT HEAD W/O CONTRAST  LOCATION: [facility]  DATE: 11/29/2024    INDICATION: Headaches. Headache. Existing HA disorder with clinical progression. No known automatically detected potential  contraindications to MRI.    COMPARISON: None.    TECHNIQUE: Routine CT head without intravenous contrast. Multiplanar reformats. Dose reduction techniques were used.    FINDINGS:  INTRACRANIAL CONTENTS: No intracranial hemorrhage, extra-axial collection, or mass effect. No CT evidence of acute infarct. Normal parenchymal attenuation. Normal ventricles and sulci.     VISUALIZED ORBITS/SINUSES/MASTOIDS: No intraorbital abnormality. No paranasal sinus mucosal disease. No middle ear or mastoid effusion.    BONES/SOFT TISSUES: No acute abnormality.      Impression    IMPRESSION: Normal head CT.

## 2024-12-09 ENCOUNTER — TELEPHONE (OUTPATIENT)
Dept: VASCULAR SURGERY | Facility: CLINIC | Age: 39
End: 2024-12-09

## 2024-12-09 ENCOUNTER — OFFICE VISIT (OUTPATIENT)
Dept: FAMILY MEDICINE | Facility: CLINIC | Age: 39
End: 2024-12-09
Payer: COMMERCIAL

## 2024-12-09 VITALS
BODY MASS INDEX: 46.65 KG/M2 | DIASTOLIC BLOOD PRESSURE: 83 MMHG | OXYGEN SATURATION: 98 % | HEART RATE: 92 BPM | RESPIRATION RATE: 22 BRPM | SYSTOLIC BLOOD PRESSURE: 124 MMHG | TEMPERATURE: 97.1 F | HEIGHT: 69 IN | WEIGHT: 315 LBS

## 2024-12-09 DIAGNOSIS — L03.115 CELLULITIS OF RIGHT LEG: ICD-10-CM

## 2024-12-09 DIAGNOSIS — D64.9 ANEMIA, UNSPECIFIED TYPE: ICD-10-CM

## 2024-12-09 DIAGNOSIS — Z09 HOSPITAL DISCHARGE FOLLOW-UP: Primary | ICD-10-CM

## 2024-12-09 LAB
ERYTHROCYTE [DISTWIDTH] IN BLOOD BY AUTOMATED COUNT: 13.1 % (ref 10–15)
HCT VFR BLD AUTO: 41.7 % (ref 40–53)
HGB BLD-MCNC: 13.3 G/DL (ref 13.3–17.7)
MCH RBC QN AUTO: 28.4 PG (ref 26.5–33)
MCHC RBC AUTO-ENTMCNC: 31.9 G/DL (ref 31.5–36.5)
MCV RBC AUTO: 89 FL (ref 78–100)
PLATELET # BLD AUTO: 501 10E3/UL (ref 150–450)
RBC # BLD AUTO: 4.68 10E6/UL (ref 4.4–5.9)
WBC # BLD AUTO: 7.8 10E3/UL (ref 4–11)

## 2024-12-09 PROCEDURE — 86140 C-REACTIVE PROTEIN: CPT | Performed by: PHYSICIAN ASSISTANT

## 2024-12-09 PROCEDURE — 99213 OFFICE O/P EST LOW 20 MIN: CPT | Performed by: PHYSICIAN ASSISTANT

## 2024-12-09 PROCEDURE — 36415 COLL VENOUS BLD VENIPUNCTURE: CPT | Performed by: PHYSICIAN ASSISTANT

## 2024-12-09 PROCEDURE — 85027 COMPLETE CBC AUTOMATED: CPT | Performed by: PHYSICIAN ASSISTANT

## 2024-12-09 ASSESSMENT — PAIN SCALES - GENERAL: PAINLEVEL_OUTOF10: NO PAIN (0)

## 2024-12-09 NOTE — PROGRESS NOTES
"  Assessment & Plan     (Z09) Hospital discharge follow-up  (primary encounter diagnosis)  (L03.115) Cellulitis of right leg  (D64.9) Anemia, unspecified type  Comment:   Plan: CBC with platelets, CRP, inflammation, Vascular        Surgery Referral            Comment:   Plan: CBC with platelets          Slowly improving cellulitis today, patient will continue course of Augmentin for the full 10 days, follow-up in clinic with me in 2 to 3 weeks sooner if needed.  Repeat CBC and CRP today to trend back to normal.  Advised compression options based on inability to wear stockings.  Referral placed to vascular for further vein evaluation for possible lymphedema in the setting of recurrent cellulitis.      MED REC REQUIRED  Post Medication Reconciliation Status:  Discharge medications reconciled, continue medications without change  BMI  Estimated body mass index is 54.09 kg/m  as calculated from the following:    Height as of this encounter: 1.757 m (5' 9.17\").    Weight as of this encounter: 167 kg (368 lb 1.6 oz).       Enedelia Saez is a 39 year old, presenting for the following health issues:  Hospital F/U      12/9/2024     1:55 PM   Additional Questions   Roomed by Esperanza AMBROSE   Accompanied by wife     \[facility] [place] [facility]\""         Hospital Follow-up Visit:    Hospital/Nursing Home/IP Rehab Facility: [facility]  Date of Admission: 11/26/2024  Date of Discharge: 12/05/2024  Reason(s) for Admission: Rash on leg that worsened  Was the patient in the ICU or did the patient experience delirium during hospitalization?  No  Do you have any other stressors you would like to discuss with your provider? No    Problems taking medications regularly:  None  Medication changes since discharge: None  Problems adhering to non-medication therapy:  None    Summary of hospitalization:  [facility] discharge summary reviewed  Diagnostic Tests/Treatments reviewed.  Follow up needed: none  Other Healthcare " Providers Involved in Patient s Care:         None  Update since discharge: improved.     Still having some swelling and itching of BL LE, no compression use to date.    Plan of care communicated with patient and family         DISCHARGE DIAGNOSES:      Right Leg Cellulitis: Slow Improvement  Development of Left Leg Cellulitis: Slow Improvement  Staph epi Bacteremia, likely contaminant  Leukocytosis: Resolved   Anemia      BRIEF HISTORY OF PRESENT ILLNESS/HOSPITAL COURSE:   Se Robins is a 39 year old male admitted on 11/26/2024 for left leg cellulitis. He has a past medical history of lymphangioma and thrombophlebitis of superficial veins of right lower extremity.      Hospital course has been complicated by slow to improve cellulitis.  Now with involvement of left lower extremity.  Per ID, this is slow to improve cellulitis with no other process going on.     Right Leg Cellulitis: Slow Improvement  Development of Left Leg Cellulitis: Slow Improvement  Staph epi Bacteremia, likely contaminant  Leukocytosis: Resolved  Presented to the ED for evaluation of right lower extremity cellulitis. He was recently seen in urgent care on 11/22/24 for this and was prescribed Augmentin. His symptoms failed to improve, prompting him to return to urgent care on 11/26/2024. He was then referred to the ED for further work up and treatment. Of note, he reports a history of blood clot in his right leg, so a duplex ultrasound was completed 11/22 which was negative for blood clots. Since the onset of his symptoms, he has been having fevers as high as 101. He received a 1l IVF bolus in the ED and has been started on IV Ancef. Started on IV Ancef on admission, switched to clindamycin as blood cultures growing gram-positive cocci in clusters. ID consulted. Abx were switched to Ceftriaxone and Clindamycin. On 12/1/24 Clinda was stopped. Patient has slow improvement on Ceftriaxone. Ultimately, ID felt that the patient could discharge  "with another 10 days of Augmentin with anticipated slow improvement given pathology, edema and body habitus.      *WBC: 13.5 on admission, down to 10.4  *Lactic acid:1.0  *CRP: 122 (improving)      - DVT was ruled out at urgent care  - CT of right lower leg with subcutaneous edema, no abscess or osteo noted.      - 11/26/24 Blood cultures with staph epidermidis  - 11/27/24 Blood Cx: No growth      - Will give last dose of IV Ceftriaxone on 12/5/24 prior to discharge              - Transition to PO Augmentin BID starting on 12/6/24 for ten days      - ID Consulted and Following   Keep the LE elevated at all times sitting and lying down   Lotion on the dry feet   Anticipate slow improvement given pathology, edema, body habitus etc   Most likely discharge tomorrow can be discharged on Augmentin for 10 days, noted he was on Augmentin before coming to the hospital but sometimes acute cellulitis require IV antibiotics during the initial phase     Anemia   - Hgb: 14.3 > 13.7 > 12.5 > 13.0 > 11.9  - No signs of bleeding  - Continue to monitor              Objective    /83   Pulse 92   Temp 97.1  F (36.2  C) (Temporal)   Resp 22   Ht 1.757 m (5' 9.17\")   Wt (!) 167 kg (368 lb 1.6 oz)   SpO2 98%   BMI 54.09 kg/m    Body mass index is 54.09 kg/m .  Physical Exam   GENERAL: healthy, alert and no distress  EYES: Eyes grossly normal to inspection, EOM intact and conjunctivae normal  RESP: breathing comfortably on room air  PSYCH: mentation appears normal, affect normal/bright  SKIN: swelling and tightness to BL LE, faint erythema and mild warmth            Signed Electronically by: Manuel Paniagua PA-C    "

## 2024-12-10 LAB — CRP SERPL-MCNC: 41.5 MG/L

## 2024-12-10 NOTE — TELEPHONE ENCOUNTER
Vascular Referral Intake    Appointment note (to be pasted into appt note. Also add where additional info is located ie: outside images pushed to PACS, in Epic, sent to HIM, etc):   Referred by Manuel Paniagua for Lymphedema, possible venous disease and recurrent cellulitis.     Specialty: Vascular Medicine    Specific Provider if Necessary:  MD Lorraine Aldana    Visit Type: New    Time Frame: Next Available    Testing/Imaging Needed Before Consult: none

## 2025-01-21 ENCOUNTER — OFFICE VISIT (OUTPATIENT)
Dept: FAMILY MEDICINE | Facility: CLINIC | Age: 40
End: 2025-01-21
Payer: COMMERCIAL

## 2025-01-21 VITALS
HEIGHT: 70 IN | RESPIRATION RATE: 21 BRPM | OXYGEN SATURATION: 98 % | DIASTOLIC BLOOD PRESSURE: 82 MMHG | SYSTOLIC BLOOD PRESSURE: 136 MMHG | BODY MASS INDEX: 45.1 KG/M2 | TEMPERATURE: 98.6 F | HEART RATE: 82 BPM | WEIGHT: 315 LBS

## 2025-01-21 DIAGNOSIS — R60.0 BILATERAL LOWER EXTREMITY EDEMA: Primary | ICD-10-CM

## 2025-01-21 PROCEDURE — 99214 OFFICE O/P EST MOD 30 MIN: CPT | Performed by: PHYSICIAN ASSISTANT

## 2025-01-21 PROCEDURE — G2211 COMPLEX E/M VISIT ADD ON: HCPCS | Performed by: PHYSICIAN ASSISTANT

## 2025-01-21 RX ORDER — FUROSEMIDE 20 MG/1
20 TABLET ORAL DAILY
Qty: 10 TABLET | Refills: 0 | Status: SHIPPED | OUTPATIENT
Start: 2025-01-21

## 2025-01-21 NOTE — PROGRESS NOTES
"  Assessment & Plan     (R60.0) Bilateral lower extremity edema  (primary encounter diagnosis)  Comment:   Plan: furosemide (LASIX) 20 MG tablet          Today I stressed the importance of keeping legs elevated when sitting, using compression garment when ambulating, focus on low-salt diet.  No signs of infection today, advised trial of Lasix to help reduce swelling in leg and necessary vascular follow-up for long-term management.    BMI  Estimated body mass index is 52.86 kg/m  as calculated from the following:    Height as of this encounter: 1.778 m (5' 10\").    Weight as of this encounter: 167.1 kg (368 lb 6.4 oz).             Enedelia Saez is a 39 year old, presenting for the following health issues:  Follow Up (Edema on right leg follow up)      1/21/2025     3:19 PM   Additional Questions   Roomed by ANAYELI Desouza   Accompanied by spouse         1/21/2025     3:19 PM   Patient Reported Additional Medications   Patient reports taking the following new medications none     History of Present Illness       Reason for visit:  Follow up about leg    He eats 2-3 servings of fruits and vegetables daily.He consumes 0 sweetened beverage(s) daily.He exercises with enough effort to increase his heart rate 30 to 60 minutes per day.  He exercises with enough effort to increase his heart rate 3 or less days per week.   He is taking medications regularly.     Patient was seen by me approximately 7 weeks ago for hospital follow-up.  At that time he was recovering from recurrent cellulitis in his right lower extremity.  Patient has completed his course of antibiotics, no return of fevers or chills but continues to have swelling in his bilateral lower extremities right greater than left.  He denies any chest pain, shortness of breath or paroxysmal nocturnal dyspnea.  He has ordered compression stockings but has not started using.  Patient and his significant other wondering about timeline for further healing.    After his last " "visit a referral to vascular surgery was sent to rule out lymphedema, address his varicose veins -patient was attempted to contact 3 times and did not schedule an appointment.                Objective    /82 (BP Location: Right arm, Patient Position: Sitting, Cuff Size: Adult Large)   Pulse 82   Temp 98.6  F (37  C) (Temporal)   Resp 21   Ht 1.778 m (5' 10\")   Wt (!) 167.1 kg (368 lb 6.4 oz)   SpO2 98%   BMI 52.86 kg/m    Body mass index is 52.86 kg/m .  Physical Exam   GENERAL: healthy, alert and no distress  EYES: Eyes grossly normal to inspection, EOM intact and conjunctivae normal  RESP: breathing comfortably on room air  PSYCH: mentation appears normal, affect normal/bright  SKIN: Bilateral lower extremities with mild erythema on anterior side, stasis dermatitis present.  Legs bilaterally are warm to touch, no significant erythema indicating infection.  Varicose veins present bilaterally but are not tender to palpation.            Signed Electronically by: Manuel Paniagua PA-C    "

## 2025-02-17 ENCOUNTER — TELEPHONE (OUTPATIENT)
Dept: DERMATOLOGY | Facility: CLINIC | Age: 40
End: 2025-02-17
Payer: COMMERCIAL

## 2025-02-17 NOTE — TELEPHONE ENCOUNTER
Excision/Mohs previsit information                                                    Diagnosis: cyst  Site(s): right cheek     Is the surgical site below the waist?  NO  If yes, instruct the patient to purchase over the counter chlorhexidine surgical soap and wash all skin below the belly button twice before surgery    No Known Allergies    Review and update allergy and medication list    Do you take the following medications:  Coumadin, Eliquis, Pradaxa, Xarelto:  NO.  If on Coumadin, INR should be checked within 7 days of surgery.  Range should be 3.5 or less or within therapeutic range.    Do you currently or have you previously had any of the following conditions:  Hepatitis:  NO  HIV/AIDS:  NO  Prolonged bleeding or bleeding disorder:  NO  Pacemaker: NO  Defibrillator:  NO  History of artificial or heart valve replacement:  NO  Endocarditis (inflammation of the inner lining of the heart's chambers and valves):  NO  Have you ever had a prosthetic joint infection:  NO  Pregnant or Breastfeeding:  N/A  Mobility device (wheelchair, transfer difficulty): NO    Important Reminders:                                                      -For Mohs, notify patient they may be here through the lunch hour.  Be prepared to have down time and we recommend they bring a book or something to do.  -Ok to take all of their medications as prescribed  -Notify patient to eat prior to appointment.  The medication is more likely to cause you to feel jittery if you have an empty stomach.  -If face is being treated, please come with a make-up free face  -Avoid wearing earrings and necklaces  -If scalp is being treated, please come with clean hair free from product  -Patient will not be able to get the site wet for 48 hrs  -No submerging wound in standing water (lake, pool, bathtub, hot tub) for 2 weeks  -No physical activity for 48 hrs (further restrictions will be discussed by MD at time of visit)    If any positives, send to RN for  further review  Kayla Ware RN

## 2025-02-20 ENCOUNTER — OFFICE VISIT (OUTPATIENT)
Dept: DERMATOLOGY | Facility: CLINIC | Age: 40
End: 2025-02-20
Attending: DERMATOLOGY
Payer: COMMERCIAL

## 2025-02-20 VITALS — OXYGEN SATURATION: 99 % | SYSTOLIC BLOOD PRESSURE: 134 MMHG | HEART RATE: 93 BPM | DIASTOLIC BLOOD PRESSURE: 77 MMHG

## 2025-02-20 DIAGNOSIS — L72.9 CYST OF SKIN: ICD-10-CM

## 2025-02-20 NOTE — LETTER
2/20/2025      Se Robins  [address]  [facility] [address]      Dear Colleague,    Thank you for referring your patient, Se Robins, to the [facility]. Please see a copy of my visit note below.    DERMATOLOGY EXCISION PROCEDURE NOTE    Dermatology Problem List:  1. Seborrheic dermatitis   - Current tx: hydrocortisone 2.5% cream, ketoconazole cream 2%, ketoconazole 2% shampoo  2. Cyst, R lateral upper cheek, s/p excision 2/20/25  - derm surg referral   3. Lymphangioma circumscriptum.  _______________________    NAME OF PROCEDURE: Excision with intermediate linear closure  Staff surgeon: Dr. Aneudy Morel  Fellow: Dr. Frandy Zavaleta   Scrub Nurse: Janette Pérez CMA    PRE-OPERATIVE DIAGNOSIS:  cyst  POST-OPERATIVE DIAGNOSIS: Same   LOCATION: right cheek  FINAL EXCISION SIZE(DEFECT SIZE): 2.7x0.8 cm  MARGIN: 0 cm  FINAL REPAIR LENGTH: 2.5 cm   ANESTHESIA: 9 mL 1% lidocaine with 1:100,000 epinephrine    INDICATIONS: This patient presented with a 2.7x0.8 cm cyst. Excision was indicated. We discussed the principles of treatment and most likely complications including scarring, bleeding, infection, incomplete excision, wound dehiscence, pain, nerve damage, and recurrence. Informed consent was obtained and the patient underwent the procedure as follows:    PROCEDURE: The patient was taken to the operative suite. Time-out was performed. The treatment area was anesthetized with 1% lidocaine with epinephrine. The area was prepped with Chlorhexidine and rinsed with sterile saline and draped with sterile towels. The lesion was delineated and excised down to subcutaneous fat in a elliptical manner. Hemostasis was obtained by electrocoagulation.     REPAIR: An intermediate layered linear closure was selected as the procedure which would maximally preserve both function and cosmesis.    After the excision of the tumor, the area was carefully undermined. Hemostasis was obtained with  bipolar electrocoagulation. Closure was oriented so that the wound was in the patient's natural skin tension lines. The deeper layers of subcutaneous and superficial (nonmuscle) fascia tissues were then approximated using 5-0 Monocryl buried verticle mattress sutures (deep layer suturing). The wound edges were then approximated; additional buried sutures were placed in a similar fashion where needed. 5-0 Vicryl Rapide simple running sutures (superficial layer suturing) were carefully placed for maximum eversion and meticulous approximation.    Estimated blood loss was less than 10 ml for all surgical sites. A sterile pressure dressing was applied and wound care instructions, with a written handout, were given. The patient was discharged from the [facility] alert and ambulatory.    The patient elected for pathology results to automatically release and understands that the clinical staff will contact them as soon as possible to notify them of the results.    Follow-up: PRN    Dr. Aneudy Morel MD was physically present  for the entire procedure and always immediately available. .    Anatomic Pathology Results: pending    Clinical Follow-Up: as scheduled with Nori Ramirez MD in March 2025    Staff Involved:  Scribe/Staff    Scribe Disclosure:   I, Marie Lawrence, am serving as a scribe; to document services personally performed by Dr. Aneudy Morel - -based on data collection and the provider's statements to me.     Staff attestation:  The documentation recorded by the scribe accurately reflects the services I personally performed and the decisions I personally made. I have made edits where needed.    Electronically signed by:     Staff Physician Comments:   I saw and evaluated the patient with the Mohs Surgery Fellow (Dr. Frandy Zavaleta) and I agree with the assessment and plan and the above description of the procedure as documented by the scribe. I personally performed the entire procedure  and entire exam.    Aneudy Morel DO    Department of Dermatology  [facility]: Phone: [phone], Fax:[phone]  [facility]: Phone: [phone], Fax: [phone]        Again, thank you for allowing me to participate in the care of your patient.        Sincerely,        Aneudy Morel MD    Electronically signed

## 2025-02-20 NOTE — PROGRESS NOTES
DERMATOLOGY EXCISION PROCEDURE NOTE    Dermatology Problem List:  1. Seborrheic dermatitis   - Current tx: hydrocortisone 2.5% cream, ketoconazole cream 2%, ketoconazole 2% shampoo  2. Cyst, R lateral upper cheek, s/p excision 2/20/25  - derm surg referral   3. Lymphangioma circumscriptum.  _______________________    NAME OF PROCEDURE: Excision with intermediate linear closure  Staff surgeon: Dr. Aneudy Morel  Fellow: Dr. Frandy Zavaleta   Scrub Nurse: Janette Pérez CMA    PRE-OPERATIVE DIAGNOSIS:  cyst  POST-OPERATIVE DIAGNOSIS: Same   LOCATION: right cheek  FINAL EXCISION SIZE(DEFECT SIZE): 2.7x0.8 cm  MARGIN: 0 cm  FINAL REPAIR LENGTH: 2.5 cm   ANESTHESIA: 9 mL 1% lidocaine with 1:100,000 epinephrine    INDICATIONS: This patient presented with a 2.7x0.8 cm cyst. Excision was indicated. We discussed the principles of treatment and most likely complications including scarring, bleeding, infection, incomplete excision, wound dehiscence, pain, nerve damage, and recurrence. Informed consent was obtained and the patient underwent the procedure as follows:    PROCEDURE: The patient was taken to the operative suite. Time-out was performed. The treatment area was anesthetized with 1% lidocaine with epinephrine. The area was prepped with Chlorhexidine and rinsed with sterile saline and draped with sterile towels. The lesion was delineated and excised down to subcutaneous fat in a elliptical manner. Hemostasis was obtained by electrocoagulation.     REPAIR: An intermediate layered linear closure was selected as the procedure which would maximally preserve both function and cosmesis.    After the excision of the tumor, the area was carefully undermined. Hemostasis was obtained with bipolar electrocoagulation. Closure was oriented so that the wound was in the patient's natural skin tension lines. The deeper layers of subcutaneous and superficial (nonmuscle) fascia tissues were then approximated using 5-0 Monocryl buried  verticle mattress sutures (deep layer suturing). The wound edges were then approximated; additional buried sutures were placed in a similar fashion where needed. 5-0 Vicryl Rapide simple running sutures (superficial layer suturing) were carefully placed for maximum eversion and meticulous approximation.    Estimated blood loss was less than 10 ml for all surgical sites. A sterile pressure dressing was applied and wound care instructions, with a written handout, were given. The patient was discharged from the [facility] alert and ambulatory.    The patient elected for pathology results to automatically release and understands that the clinical staff will contact them as soon as possible to notify them of the results.    Follow-up: PRN    Dr. Aneudy Morel MD was physically present  for the entire procedure and always immediately available. .    Anatomic Pathology Results: pending    Clinical Follow-Up: as scheduled with Nori Ramirez MD in March 2025    Staff Involved:  Scribe/Staff    Scribe Disclosure:   I, Marie Lawrence, am serving as a scribe; to document services personally performed by Dr. Aneudy Morel - -based on data collection and the provider's statements to me.     Staff attestation:  The documentation recorded by the scribe accurately reflects the services I personally performed and the decisions I personally made. I have made edits where needed.    Electronically signed by:     Staff Physician Comments:   I saw and evaluated the patient with the Mohs Surgery Fellow (Dr. Frandy Zavaleta) and I agree with the assessment and plan and the above description of the procedure as documented by the scribe. I personally performed the entire procedure and entire exam.    Aneudy Morel DO    Department of Dermatology  [facility]: Phone: [phone], Fax:[phone]  [facility]: Phone: [phone], Fax: [phone]

## 2025-02-20 NOTE — NURSING NOTE
The following medication was given:     MEDICATION:  Lidocaine with epinephrine 1% [id number]  ROUTE: SQ  SITE: see procedure note  DOSE: 9 mL  LOT #: [id number]  : [employer]  EXPIRATION DATE: 08/31/2026  NDC#: 56591-550-91  Was there drug waste? no  Multi-dose vial: Yes    Dermabond and pressure dressing applied to excision site on right cheek.  Wound care instructions reviewed with patient and AVS provided.  Patient verbalized understanding.  Post op appointment scheduled No. Patient will follow up for suture removal: N/A.  No further questions or concerns at this time.      Janette Pérez CMA  February 20, 2025

## 2025-02-20 NOTE — PATIENT INSTRUCTIONS
Caring for your skin after surgery    For the first 48 hours after your surgery:  Leave the pressure dressing on and keep it dry. If it should come loose, you may re-tape it, but do not take it off.  Relax and take it easy.  Do not do any vigorous exercise, heavy lifting or bending forward. This could cause the wound to bleed.  If the wound is on your head, sleeping with your head elevated for the first few nights will help with swelling and bleeding. (Use linens/pillow cases that would be ok to get blood on in the event there is some oozing from the bandage).  Do not submerge the wound in any standing water for two weeks or until the site is healed.  Post-operative pain is usually mild.  You may alternate between 1000 mg of Tylenol (acetaminophen) and 400 mg of Ibuprofen every 4 hours.  This means, for example, that you could take the followin,000 mg of Tylenol, followed 4 hours later by 400 mg Ibuprofen, followed 4 hours later with 1,000 mg of Tylenol, and so forth.  Do not take more than 4,000 mg of acetaminophen in a 24-hour period or 3200 mg of Ibuprofen in a 24-hr period.    Avoid alcohol and vitamin E as these may increase your tendency to bleed.  Apply an ice pack for 20 min every 2-3 hours while awake.  This may help reduce swelling, bruising, and pain.  Make sure the ice pack is waterproof so that the pressure bandage doesn't get wet.  You may see a small amount of drainage or blood on your pressure bandage. This is normal. However:  If drainage or bleeding continues or saturates the bandage, you will need to apply firm pressure over the bandage with a clean washcloth for 15 minutes.    Remove the saturated bandage.  If bleeding has stopped, apply Vaseline over the suture line and cover with a non-stick bandage.  To add some pressure over the wound, fold a piece of gauze and tape over the area.  If bleeding continues after applying pressure for 15 minutes, apply an ice pack with gentle pressure to the  bandaged area for another 15 minutes.  If bleeding still continues, call our office or go to the nearest emergency room.    48 Hours After Surgery:  Your surgical site has been closed with DermaBond, a  super glue,   for skin procedures.  DermaBond seals your incision site.  Carefully remove the pressure bandage.  If it seems sticky or difficult to remove, you may need to soak it off in the shower.  Once the pressure bandage has been removed, avoid prolonged wetness to the area covered with DermaBond.  Pat dry when out of the shower.  Avoid rubbing or scrubbing the site.  Avoid topical medications, lotions, creams, ointment, or oils.  Cover with a non-stick bandage.    If skin glue and sutures are still intact at 2 weeks after your procedure, you can use an adhesive remover wipe to loosen the skin glue. If sutures are still present, start applying Vaseline daily and cover with non-stick gauze OR you may use hydrogen peroxide to help the sutures dissolve.       What to expect:  The first couple of days your wound may be tender and may bleed slightly when doing wound care.  There may be swelling and bruising around the wound, especially if it is near the eyes. For your comfort, you may apply ice or cold compresses to the area.  The area around your wound may be numb for several weeks or even months.  You may experience periodic sharp pain or mild itching around the wound as it heals.   The suture line will look dark pink at first and the edges of the wound will be reddened. This will lighten up each day.    Call Us If:  You have bleeding that will not stop after applying pressure and ice.  You have pain that is not controlled with Tylenol and Ibuprofen.  You have signs or symptoms of an infection such as fever over 100 degrees Fahrenheit, redness, swelling, or warmth spreading from the wound, increasing pain after the first 48 hours, or white/yellow/green drainage from the wound (may or may not have a foul  odor).    [facility], Dermatology [phone].  Ask to speak with the staff in [place].  For urgent needs outside of business hours [facility] at [phone] and ask to speak with/page the dermatology resident on call.

## 2025-02-25 LAB
PATH REPORT.COMMENTS IMP SPEC: NORMAL
PATH REPORT.COMMENTS IMP SPEC: NORMAL
PATH REPORT.FINAL DX SPEC: NORMAL
PATH REPORT.GROSS SPEC: NORMAL
PATH REPORT.MICROSCOPIC SPEC OTHER STN: NORMAL
PATH REPORT.RELEVANT HX SPEC: NORMAL

## 2025-06-15 ENCOUNTER — HEALTH MAINTENANCE LETTER (OUTPATIENT)
Age: 40
End: 2025-06-15